# Patient Record
Sex: MALE | Race: WHITE | HISPANIC OR LATINO | Employment: FULL TIME | URBAN - METROPOLITAN AREA
[De-identification: names, ages, dates, MRNs, and addresses within clinical notes are randomized per-mention and may not be internally consistent; named-entity substitution may affect disease eponyms.]

---

## 2018-02-26 ENCOUNTER — TRANSCRIBE ORDERS (OUTPATIENT)
Dept: PHYSICAL THERAPY | Facility: CLINIC | Age: 23
End: 2018-02-26

## 2018-02-26 DIAGNOSIS — S46.911A SHOULDER STRAIN, RIGHT, INITIAL ENCOUNTER: ICD-10-CM

## 2018-02-26 DIAGNOSIS — S46.912A STRAIN OF LEFT SHOULDER, INITIAL ENCOUNTER: Primary | ICD-10-CM

## 2018-02-28 ENCOUNTER — TREATMENT (OUTPATIENT)
Dept: PHYSICAL THERAPY | Facility: CLINIC | Age: 23
End: 2018-02-28

## 2018-02-28 DIAGNOSIS — M25.511 ACUTE PAIN OF BOTH SHOULDERS: Primary | ICD-10-CM

## 2018-02-28 DIAGNOSIS — S46.911A SHOULDER STRAIN, RIGHT, INITIAL ENCOUNTER: ICD-10-CM

## 2018-02-28 DIAGNOSIS — S46.912A STRAIN OF LEFT SHOULDER, INITIAL ENCOUNTER: ICD-10-CM

## 2018-02-28 DIAGNOSIS — M25.512 ACUTE PAIN OF BOTH SHOULDERS: Primary | ICD-10-CM

## 2018-02-28 PROCEDURE — 97014 ELECTRIC STIMULATION THERAPY: CPT | Performed by: PHYSICAL THERAPIST

## 2018-02-28 PROCEDURE — 97530 THERAPEUTIC ACTIVITIES: CPT | Performed by: PHYSICAL THERAPIST

## 2018-02-28 PROCEDURE — 97001 PR PHYS THERAPY EVALUATION: CPT | Performed by: PHYSICAL THERAPIST

## 2018-02-28 PROCEDURE — 97110 THERAPEUTIC EXERCISES: CPT | Performed by: PHYSICAL THERAPIST

## 2018-03-02 ENCOUNTER — TREATMENT (OUTPATIENT)
Dept: PHYSICAL THERAPY | Facility: CLINIC | Age: 23
End: 2018-03-02

## 2018-03-02 DIAGNOSIS — S46.911D STRAIN OF RIGHT SHOULDER, SUBSEQUENT ENCOUNTER: Primary | ICD-10-CM

## 2018-03-02 PROCEDURE — 97110 THERAPEUTIC EXERCISES: CPT | Performed by: PHYSICAL THERAPIST

## 2018-03-02 PROCEDURE — 97014 ELECTRIC STIMULATION THERAPY: CPT | Performed by: PHYSICAL THERAPIST

## 2018-03-02 PROCEDURE — 97530 THERAPEUTIC ACTIVITIES: CPT | Performed by: PHYSICAL THERAPIST

## 2018-03-02 NOTE — PROGRESS NOTES
"Physical Therapy Daily Progress Note    Time In 1317  Time Out 1404    Conrado An reports: \"My shoulder is feeling a little better. There is still one spot that is a little sore.\"    Subjective     Objective   See Exercise, Manual, and Modality Logs for complete treatment.   *Initiated ball wall circles in flexion and abduction  *Initiated wall push-ups  *Increased reps of scapular strengthening activities  *Initiated UBE     Assessment & Plan     Assessment  Assessment details: Patient reports mild improvement in (R) shoulder s/s since initial evaluation.  He is able to progress most strengthening activities this date as well as initiate UBE, wall push-ups, and ball wall circles for stabilization but does c/o some soreness and discomfort with abduction and external rotation activities.  He responds positively to modality application.        Progress strengthening /stabilization /functional activity         Manual Therapy:         mins  99454;  Therapeutic Exercise:    18     mins  85093;     Neuromuscular Adolph:        mins  82512;    Therapeutic Activity:     14     mins  16817;     Gait Training:           mins  25638;     Ultrasound:          mins  09510;    Electrical Stimulation:    15     mins  05864 ( );  Dry Needling          mins self-pay    Timed Treatment:   32   mins   Total Treatment:     47   mins    Negrita Smith PT, DPT  Physical Therapist  [place] License # [id number]    "

## 2018-03-05 ENCOUNTER — TREATMENT (OUTPATIENT)
Dept: PHYSICAL THERAPY | Facility: CLINIC | Age: 23
End: 2018-03-05

## 2018-03-05 DIAGNOSIS — S46.911D STRAIN OF RIGHT SHOULDER, SUBSEQUENT ENCOUNTER: Primary | ICD-10-CM

## 2018-03-05 DIAGNOSIS — M25.512 ACUTE PAIN OF BOTH SHOULDERS: ICD-10-CM

## 2018-03-05 DIAGNOSIS — M25.511 ACUTE PAIN OF BOTH SHOULDERS: ICD-10-CM

## 2018-03-05 PROCEDURE — 97110 THERAPEUTIC EXERCISES: CPT | Performed by: PHYSICAL THERAPIST

## 2018-03-05 PROCEDURE — 97014 ELECTRIC STIMULATION THERAPY: CPT | Performed by: PHYSICAL THERAPIST

## 2018-03-05 PROCEDURE — 97530 THERAPEUTIC ACTIVITIES: CPT | Performed by: PHYSICAL THERAPIST

## 2018-03-05 NOTE — PROGRESS NOTES
"Physical Therapy Progress Note    Time In 0902  Time Out 0959      3/5/2018  Justin Johnson MD    Re: Conrado An  ________________________________________________________________    Mr. Conrado An, has attended 3/3 PT sessions.  Treatment has consisted of: patient education, therapeutic exercise, therapeutic activity, and modalities.    S: Mr. Conrado An states: \"My shoulder is doing a lot better.  I just occasionally have some sharp pains in my shoulder and it feels weak at times. There are a few directions of the [isometrics] that aggravate my shoulder a little bit and make my bicep muscle feel weak. [Patient indicates external rotation and abduction and to much lesser extent internal rotation.\"    Subjective Evaluation    Pain  Current pain ratin  At best pain ratin  At worst pain ratin  Location: (R) anterior shoulder, LHB tendon  Quality: sharp           Objective       Tenderness     Left Shoulder   No tenderness     Right Shoulder  Tenderness in the biceps tendon (proximal), bicipital groove and supraspinatus tendon.     Active Range of Motion   Left Shoulder   Flexion: 162 degrees   Extension: 61 degrees   Abduction: 171 degrees   External rotation 90°: 96 degrees   Internal rotation 90°: 74 degrees     Right Shoulder   Flexion: 171 degrees   Extension: 50 degrees   Abduction: 174 degrees   External rotation 90°: 92 degrees  Internal rotation 90°: 61 (\"feels weak around 45 deg\") degrees     Strength/Myotome Testing     Left Shoulder     Planes of Motion   Flexion: 5   Extension: 5   Abduction: 5   External rotation at 0°: 5   Internal rotation at 0°: 5     Right Shoulder     Planes of Motion   Flexion: 4+ (sharp pain)   Extension: 5   Right shoulder abduction strength: 5-/5; \"feels a little weak\"   External rotation at 0°: 4+   Internal rotation at 0°: 5     Left Elbow   Flexion: 5  Extension: 5    Right Elbow   Flexion: 5  Extension: 5    Tests     Right Shoulder   Positive active " "compression ([place]) and empty can.   Negative Speed's.     Additional Tests Details  Mild weakness and pain with empty can  Sharp pain with Panchal's (thumb up position worse than thumb down)     See Exercise, Manual, and Modality Logs for complete treatment.       Assessment & Plan     Assessment  Assessment details: Patient has been compliant and cooperative with PT efforts.  He verbalizes much improved (R) shoulder s/s, though he is not yet 100%.  He reports (R) anterior shoulder pain (to lesser degree) in push-up position and experiences irritation of (R) shoulder s/s with isometric abduction and external rotation, citing also a feeling of \"weakness in my bicep muscle.\"  Special testing indicates weakness with empty can and sharp pain and weakness with Obriens test, moreso in thumb up position than thumb down.  Due to progress seen with PT interventions thus far but mild persistent deficits, patient may benefit from one additional week to progress strengthening for return to optimal, painfree function.  Please advise after your exam.        Awaiting MD orders - patient to see MD immediately following session.         Manual Therapy:         mins  34280;  Therapeutic Exercise:    24     mins  99082;     Neuromuscular Adolph:        mins  02550;    Therapeutic Activity:     18     mins  33626;     Gait Training:           mins  23996;     Ultrasound:          mins  51759;    Electrical Stimulation:    15     mins  43514 ( );  Dry Needling          mins self-pay    Timed Treatment:   42   mins   Total Treatment:     57   mins    Negrita Smith PT, DPT  Physical Therapist  [place] License # [id number]  "

## 2018-03-08 ENCOUNTER — OFFICE VISIT (OUTPATIENT)
Dept: PHYSICAL THERAPY | Facility: CLINIC | Age: 23
End: 2018-03-08

## 2018-03-08 DIAGNOSIS — M25.512 ACUTE PAIN OF BOTH SHOULDERS: ICD-10-CM

## 2018-03-08 DIAGNOSIS — M25.511 ACUTE PAIN OF BOTH SHOULDERS: ICD-10-CM

## 2018-03-08 DIAGNOSIS — S46.911D STRAIN OF RIGHT SHOULDER, SUBSEQUENT ENCOUNTER: Primary | ICD-10-CM

## 2018-03-08 PROCEDURE — 97110 THERAPEUTIC EXERCISES: CPT | Performed by: PHYSICAL THERAPIST

## 2018-03-08 PROCEDURE — 97530 THERAPEUTIC ACTIVITIES: CPT | Performed by: PHYSICAL THERAPIST

## 2018-03-08 NOTE — PROGRESS NOTES
Physical Therapy Daily Progress Note    Time In 0900  Time Out 1015    Conrado An reports: shoulder a little bothersome with wall push ups today states patient.  Expressed feeling pain in shoulder with activity.    Subjective     Objective       Postural Observations  Seated posture: fair  Standing posture: fair  Correction of posture: makes symptoms better    Additional Postural Observation Details  Rounded shoulders, increased kyphosis  Corrects with cues with reported relief and improved ease of movement with exercises.    Palpation     Right Tenderness of the middle deltoid and posterior deltoid.     Tenderness     Right Shoulder  Tenderness in the bicipital groove and supraspinatus tendon.      See Exercise, Manual, and Modality Logs for complete treatment.   Pain free exercise performance  Exercise rationale and progression, healing process, purpose of therapy  Anatomy and structure of affected musculature   Postural awareness  Verbal/tactile cues in regards to shoulder positioning with exercise performance/actvity.   Cues for proper performance with body positioning with wall push ups.  Added: shoulder ext jose alfredo, sh IR and ER with t band  Issued blue band for home use and progression.  Already has green band      Assessment/Plan  Complaint and cooperative with rehab efforts.  Shoulder is improving but still feels weak with certain movements/positions right arm.  Benefits from verbal and tactile cues to ensure proper posture and scapular positioning with exercise/activity.  Progressing well, sees benefit with PT and notes relief with modality and ice application.    Progress strengthening /stabilization /functional activity as symptoms allow.           Manual Therapy:         mins  16588;  Therapeutic Exercise:     25    mins  37026;     Neuromuscular Adolph:       mins  69191;    Therapeutic Activity:     25     mins  29599;     Gait Training:           mins  78172;     Ultrasound:          mins  69554;     Electrical Stimulation:    20     mins  72452 ( );      Timed Treatment:  70   mins   Total Treatment:    75   mins    Byron Pereira PTA    Physical Therapist Assistant [place] [id number]

## 2018-03-09 ENCOUNTER — OFFICE VISIT (OUTPATIENT)
Dept: PHYSICAL THERAPY | Facility: CLINIC | Age: 23
End: 2018-03-09

## 2018-03-09 DIAGNOSIS — M25.511 ACUTE PAIN OF BOTH SHOULDERS: ICD-10-CM

## 2018-03-09 DIAGNOSIS — S46.911D STRAIN OF RIGHT SHOULDER, SUBSEQUENT ENCOUNTER: Primary | ICD-10-CM

## 2018-03-09 DIAGNOSIS — M25.512 ACUTE PAIN OF BOTH SHOULDERS: ICD-10-CM

## 2018-03-09 PROCEDURE — 97530 THERAPEUTIC ACTIVITIES: CPT | Performed by: PHYSICAL THERAPIST

## 2018-03-09 PROCEDURE — 97014 ELECTRIC STIMULATION THERAPY: CPT | Performed by: PHYSICAL THERAPIST

## 2018-03-09 PROCEDURE — 97110 THERAPEUTIC EXERCISES: CPT | Performed by: PHYSICAL THERAPIST

## 2018-03-09 NOTE — PROGRESS NOTES
Physical Therapy Daily Progress Note    Time In 0930 Time Out 1055    Conrado An reports: shoulder feels weak with certain activities(abduction, er) though I worked it pretty hard today with the bands states patient.    Subjective     Objective       Active Range of Motion     Right Shoulder   Flexion: 170 (deg seated) degrees   Abduction: 180 (deg seated) degrees   External rotation 90°: 92 degrees  External rotation BTH: T4 (deg seated)   Internal rotation 90°: 65 (deg seated) degrees   Internal rotation BTB: T10      See Exercise, Manual, and Modality Logs for complete treatment.     [place] box activities chest, shoulder, overhead level x 2 reps each in 1 min increments each totaling 6 mins.    Assessment/Plan  Subjective complaints right shoulder discomfort remain present but patient demonstrates improved capability to perform exercise/activity without increased symptoms.  Benefits from postural awareness cues(verbal and tactile) to decrease kyphosis and improve scapular positioning.  Improved positioning of shoulder blades lessens impingement complaints and subsequent improved ROM right shoulder is demonstrated.  Some restrictions still present in IR plane.    Progress strengthening /stabilization /functional activity.  Begin box lifts and unilateral carry activities.           Manual Therapy:        mins  85963;  Therapeutic Exercise:    35     mins  97626;     Neuromuscular Adolph:       mins  70850;    Therapeutic Activity:     15     mins  27855;     Gait Training:           mins  94197;     Ultrasound:         mins  63746;    Electrical Stimulation:   20     mins  31189 ( );      Timed Treatment:50   mins   Total Treatment:   85    mins    Byron Pereira, PTA    Physical Therapist Assistant [place] [id number]

## 2018-03-12 ENCOUNTER — OFFICE VISIT (OUTPATIENT)
Dept: PHYSICAL THERAPY | Facility: CLINIC | Age: 23
End: 2018-03-12

## 2018-03-12 DIAGNOSIS — S46.911D STRAIN OF RIGHT SHOULDER, SUBSEQUENT ENCOUNTER: Primary | ICD-10-CM

## 2018-03-12 DIAGNOSIS — M25.511 ACUTE PAIN OF BOTH SHOULDERS: ICD-10-CM

## 2018-03-12 DIAGNOSIS — M25.512 ACUTE PAIN OF BOTH SHOULDERS: ICD-10-CM

## 2018-03-12 PROCEDURE — 97014 ELECTRIC STIMULATION THERAPY: CPT | Performed by: PHYSICAL THERAPIST

## 2018-03-12 PROCEDURE — 97110 THERAPEUTIC EXERCISES: CPT | Performed by: PHYSICAL THERAPIST

## 2018-03-12 PROCEDURE — 97530 THERAPEUTIC ACTIVITIES: CPT | Performed by: PHYSICAL THERAPIST

## 2018-03-12 NOTE — PROGRESS NOTES
Physical Therapy Daily Progress Note    Time In 0930  Time Out 1110    Conrado Nataliya reports: shoulder still bothers me with certain motions/activities. [place] box was much harder overhead than at chest level for me states patient    Subjective     Objective   See Exercise, Manual, and Modality Logs for complete treatment.   Box lift 5-20 lbs 14 inch height to waist; 5-15 lbs to ch/sh level and 5 -10 lbs overhead       Assessment/Plan  Subjective complaints of right shoulder still present, increased with prolonged activity.  Demonstrates improved performance of HEP activities without previous complaints of discomfort while demonstrating prolonged endurance/duration of activity.      Other  See one more time prior to MD follow up.           Manual Therapy:        mins  25571;  Therapeutic Exercise:  35       mins  40610;     Neuromuscular Adolph:       mins  38045;    Therapeutic Activity:    20      mins  43774;     Gait Training:          mins  96704;     Ultrasound:          mins  99002;    Electrical Stimulation:  20     mins  39374 ( );      Timed Treatment:  55   mins   Total Treatment:    100   mins    Byron Pereira PTA    Physical Therapist Assistant [place] [id number]

## 2018-03-13 ENCOUNTER — OFFICE VISIT (OUTPATIENT)
Dept: PHYSICAL THERAPY | Facility: CLINIC | Age: 23
End: 2018-03-13

## 2018-03-13 DIAGNOSIS — M25.511 ACUTE PAIN OF BOTH SHOULDERS: ICD-10-CM

## 2018-03-13 DIAGNOSIS — S46.911D STRAIN OF RIGHT SHOULDER, SUBSEQUENT ENCOUNTER: Primary | ICD-10-CM

## 2018-03-13 DIAGNOSIS — M25.512 ACUTE PAIN OF BOTH SHOULDERS: ICD-10-CM

## 2018-03-13 PROCEDURE — 97530 THERAPEUTIC ACTIVITIES: CPT | Performed by: PHYSICAL THERAPIST

## 2018-03-13 PROCEDURE — 97110 THERAPEUTIC EXERCISES: CPT | Performed by: PHYSICAL THERAPIST

## 2018-03-13 NOTE — PROGRESS NOTES
Physical Therapy Progress Note    Time In 0900  Time Out 1105      3/13/2018  Justin Johnson MD    Re: oCnrado An  ________________________________________________________________    Mr. Conrado An, has attended 3/3 additional PT sessions to his shoulders(primarily right shoulder), 6 times overall.  Treatment has consisted of: therapeutic exercise, simulated job activities, ice/electrical stimulation and patient education.     S: Mr. Conrado An states: that his shoulders feel better since last MD follow up/continuing PT.  Reports that he can lift more and has more strength in his shoulders but still notes discomfort with certain positions/movements.  Feels about 80% improved overall.    Subjective Evaluation    Pain  Current pain rating: 3  At best pain ratin  At worst pain ratin  Quality: sharp and knife-like  Relieving factors: change in position and rest           Objective       Palpation     Right Tenderness of the middle deltoid and supraspinatus.     Tenderness     Left Shoulder   Tenderness in the bicipital groove.     Right Shoulder  Tenderness in the supraspinatus tendon.     Active Range of Motion   Left Shoulder   Flexion: 175 (deg) degrees   Abduction: 178 (deg) degrees   External rotation BTH: T3   Internal rotation BTB: T10     Right Shoulder   Flexion: 170 (deg) degrees   Abduction: 172 (deg) degrees   External rotation BTH: T3   Internal rotation BTB: T10     Strength/Myotome Testing     Left Shoulder     Planes of Motion   Flexion: 5   Abduction: 5   External rotation at 0°: 5   Internal rotation at 0°: 5     Isolated Muscles   Rhomboids: 5     Right Shoulder     Planes of Motion   Flexion: 4 (pain with resistive testing)   Abduction: 4+   External rotation at 0°: 4   Internal rotation at 0°: 4+     Isolated Muscles   Rhomboids: 4+     Tests     Left Shoulder   Negative empty can, Hawkin's, Neer's and Speed's.     Right Shoulder   Positive empty can, Hawkin's and Neer's.   Negative  Speed's.      See Exercise, Manual, and Modality Logs for complete treatment.   Added upper trap stretch right  Performed unilateral bucket carries R and L UE 5-40 lbs  Performed lift trials with simulated job activities:  Waist to shoulder 2.5 lbs to 25 lbs and shoulder to overhead 2.5 lbs - 7.5 lbs  [place] box activities overhead x 2 min.        Assessment/Plan  Compliant and cooperative with rehab efforts.  Subjectively reports continued improvement of his shoulders in regards to strength and function, though still notes some pain/discomfort with certain movements/positions.  Objectively, he presents with improved pain free shoulder AROM and mm strength, though some decreased strength is noted R versus L UE especially in abd/ER and IR planes.  Special tests reveal some positive impingement signs R versus L UE and palpation reveals tenderness along his right suprasinatus.  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   He may benefit from a short course of continued PT to help further/fully resolve his symptoms to perform his unrestricted job activities.  Please advise after your exam!    Other   To MD post Rx with letter.  Will await further orders regarding continued PT intervention           Manual Therapy:        mins  60717;  Therapeutic Exercise:   30     mins  46994;     Neuromuscular Adolph:       mins  91279;    Therapeutic Activity:     28    mins  24951;     Gait Training:           mins  06876;     Ultrasound:         mins  37203;    Electrical Stimulation:   15     mins  17649 ( );      Timed Treatment:   73   mins   Total Treatment:     125  mins    Byron Pereira PTA  Physical Therapist Assistant # [id number]

## 2019-04-07 ENCOUNTER — HOSPITAL ENCOUNTER (OUTPATIENT)
Dept: URGENT CARE | Facility: CLINIC | Age: 24
Discharge: HOME | End: 2019-04-07

## 2019-04-09 ENCOUNTER — HOSPITAL ENCOUNTER (OUTPATIENT)
Dept: URGENT CARE | Facility: CLINIC | Age: 24
Discharge: HOME | End: 2019-04-09

## 2019-04-26 ENCOUNTER — HOSPITAL ENCOUNTER (OUTPATIENT)
Dept: URGENT CARE | Facility: CLINIC | Age: 24
Discharge: HOME | End: 2019-04-26
Attending: PHYSICIAN ASSISTANT

## 2019-07-15 ENCOUNTER — HOSPITAL ENCOUNTER (OUTPATIENT)
Dept: URGENT CARE | Facility: CLINIC | Age: 24
Discharge: HOME | End: 2019-07-15
Attending: EMERGENCY MEDICINE

## 2019-10-16 ENCOUNTER — HOSPITAL ENCOUNTER (OUTPATIENT)
Dept: FAMILY MEDICINE CLINIC | Facility: CLINIC | Age: 24
Discharge: HOME | End: 2019-10-16
Attending: NURSE PRACTITIONER

## 2019-10-16 ENCOUNTER — OFFICE VISIT CONVERTED (OUTPATIENT)
Dept: FAMILY MEDICINE CLINIC | Facility: CLINIC | Age: 24
End: 2019-10-16
Attending: NURSE PRACTITIONER

## 2019-10-16 LAB
ALBUMIN SERPL-MCNC: 4.9 G/DL (ref 3.5–5)
ALBUMIN/GLOB SERPL: 1.8 {RATIO} (ref 1.4–2.6)
ALP SERPL-CCNC: 67 U/L (ref 53–128)
ALT SERPL-CCNC: 64 U/L (ref 10–40)
ANION GAP SERPL CALC-SCNC: 22 MMOL/L (ref 8–19)
AST SERPL-CCNC: 36 U/L (ref 15–50)
BASOPHILS # BLD AUTO: 0.05 10*3/UL (ref 0–0.2)
BASOPHILS NFR BLD AUTO: 0.8 % (ref 0–3)
BILIRUB SERPL-MCNC: 1.25 MG/DL (ref 0.2–1.3)
BUN SERPL-MCNC: 12 MG/DL (ref 5–25)
BUN/CREAT SERPL: 11 {RATIO} (ref 6–20)
CALCIUM SERPL-MCNC: 10 MG/DL (ref 8.7–10.4)
CHLORIDE SERPL-SCNC: 103 MMOL/L (ref 99–111)
CHOLEST SERPL-MCNC: 156 MG/DL (ref 107–200)
CHOLEST/HDLC SERPL: 6.8 {RATIO} (ref 3–6)
CONV ABS IMM GRAN: 0.04 10*3/UL (ref 0–0.2)
CONV CO2: 21 MMOL/L (ref 22–32)
CONV IMMATURE GRAN: 0.7 % (ref 0–1.8)
CONV TOTAL PROTEIN: 7.6 G/DL (ref 6.3–8.2)
CREAT UR-MCNC: 1.11 MG/DL (ref 0.7–1.2)
DEPRECATED RDW RBC AUTO: 42 FL (ref 35.1–43.9)
EOSINOPHIL # BLD AUTO: 0.13 10*3/UL (ref 0–0.7)
EOSINOPHIL # BLD AUTO: 2.2 % (ref 0–7)
ERYTHROCYTE [DISTWIDTH] IN BLOOD BY AUTOMATED COUNT: 12.9 % (ref 11.6–14.4)
EST. AVERAGE GLUCOSE BLD GHB EST-MCNC: 80 MG/DL
GFR SERPLBLD BASED ON 1.73 SQ M-ARVRAT: >60 ML/MIN/{1.73_M2}
GLOBULIN UR ELPH-MCNC: 2.7 G/DL (ref 2–3.5)
GLUCOSE SERPL-MCNC: 91 MG/DL (ref 70–99)
HBA1C MFR BLD: 4.4 % (ref 3.5–5.7)
HCT VFR BLD AUTO: 40.4 % (ref 42–52)
HDLC SERPL-MCNC: 23 MG/DL (ref 40–60)
HGB BLD-MCNC: 13.9 G/DL (ref 14–18)
LDLC SERPL CALC-MCNC: 92 MG/DL (ref 70–100)
LYMPHOCYTES # BLD AUTO: 1.65 10*3/UL (ref 1–5)
LYMPHOCYTES NFR BLD AUTO: 27.5 % (ref 20–45)
MCH RBC QN AUTO: 30.8 PG (ref 27–31)
MCHC RBC AUTO-ENTMCNC: 34.4 G/DL (ref 33–37)
MCV RBC AUTO: 89.6 FL (ref 80–96)
MONOCYTES # BLD AUTO: 0.58 10*3/UL (ref 0.2–1.2)
MONOCYTES NFR BLD AUTO: 9.7 % (ref 3–10)
NEUTROPHILS # BLD AUTO: 3.54 10*3/UL (ref 2–8)
NEUTROPHILS NFR BLD AUTO: 59.1 % (ref 30–85)
NRBC CBCN: 0 % (ref 0–0.7)
OSMOLALITY SERPL CALC.SUM OF ELEC: 293 MOSM/KG (ref 273–304)
PLATELET # BLD AUTO: 185 10*3/UL (ref 130–400)
PMV BLD AUTO: 12.2 FL (ref 9.4–12.4)
POTASSIUM SERPL-SCNC: 4.3 MMOL/L (ref 3.5–5.3)
RBC # BLD AUTO: 4.51 10*6/UL (ref 4.7–6.1)
SODIUM SERPL-SCNC: 142 MMOL/L (ref 135–147)
TRIGL SERPL-MCNC: 204 MG/DL (ref 40–150)
TSH SERPL-ACNC: 3.93 M[IU]/L (ref 0.27–4.2)
VLDLC SERPL-MCNC: 41 MG/DL (ref 5–37)
WBC # BLD AUTO: 5.99 10*3/UL (ref 4.8–10.8)

## 2020-01-08 ENCOUNTER — OFFICE VISIT CONVERTED (OUTPATIENT)
Dept: UROLOGY | Facility: CLINIC | Age: 25
End: 2020-01-08
Attending: NURSE PRACTITIONER

## 2020-05-20 ENCOUNTER — OFFICE VISIT CONVERTED (OUTPATIENT)
Dept: FAMILY MEDICINE CLINIC | Facility: CLINIC | Age: 25
End: 2020-05-20
Attending: NURSE PRACTITIONER

## 2020-06-24 ENCOUNTER — HOSPITAL ENCOUNTER (OUTPATIENT)
Dept: URGENT CARE | Facility: CLINIC | Age: 25
Discharge: HOME | End: 2020-06-24

## 2020-06-30 ENCOUNTER — HOSPITAL ENCOUNTER (OUTPATIENT)
Dept: URGENT CARE | Facility: CLINIC | Age: 25
Discharge: HOME | End: 2020-06-30
Attending: EMERGENCY MEDICINE

## 2020-07-01 LAB — H PYLORI IGG SER IA-ACNC: 0.26 INDEX VALUE (ref 0–0.79)

## 2020-07-02 ENCOUNTER — OFFICE VISIT CONVERTED (OUTPATIENT)
Dept: FAMILY MEDICINE CLINIC | Facility: CLINIC | Age: 25
End: 2020-07-02
Attending: NURSE PRACTITIONER

## 2020-07-13 ENCOUNTER — HOSPITAL ENCOUNTER (OUTPATIENT)
Dept: ULTRASOUND IMAGING | Facility: HOSPITAL | Age: 25
Discharge: HOME | End: 2020-07-13
Attending: NURSE PRACTITIONER

## 2020-07-17 ENCOUNTER — PROCEDURE VISIT CONVERTED (OUTPATIENT)
Dept: UROLOGY | Facility: CLINIC | Age: 25
End: 2020-07-17
Attending: UROLOGY

## 2020-07-20 ENCOUNTER — OFFICE VISIT CONVERTED (OUTPATIENT)
Dept: FAMILY MEDICINE CLINIC | Facility: CLINIC | Age: 25
End: 2020-07-20
Attending: NURSE PRACTITIONER

## 2020-07-20 ENCOUNTER — HOSPITAL ENCOUNTER (OUTPATIENT)
Dept: FAMILY MEDICINE CLINIC | Facility: CLINIC | Age: 25
Discharge: HOME | End: 2020-07-20
Attending: NURSE PRACTITIONER

## 2020-07-21 LAB — URATE SERPL-MCNC: 7.3 MG/DL (ref 3.5–8.5)

## 2020-09-11 ENCOUNTER — HOSPITAL ENCOUNTER (OUTPATIENT)
Dept: URGENT CARE | Facility: CLINIC | Age: 25
Discharge: HOME | End: 2020-09-11
Attending: EMERGENCY MEDICINE

## 2020-09-13 LAB — SARS-COV-2 RNA SPEC QL NAA+PROBE: NOT DETECTED

## 2020-10-29 ENCOUNTER — HOSPITAL ENCOUNTER (OUTPATIENT)
Dept: URGENT CARE | Facility: CLINIC | Age: 25
Discharge: HOME | End: 2020-10-29

## 2020-11-01 LAB — SARS-COV-2 RNA SPEC QL NAA+PROBE: NOT DETECTED

## 2021-02-08 ENCOUNTER — OFFICE VISIT CONVERTED (OUTPATIENT)
Dept: FAMILY MEDICINE CLINIC | Facility: CLINIC | Age: 26
End: 2021-02-08
Attending: NURSE PRACTITIONER

## 2021-03-08 ENCOUNTER — TELEMEDICINE CONVERTED (OUTPATIENT)
Dept: FAMILY MEDICINE CLINIC | Facility: CLINIC | Age: 26
End: 2021-03-08
Attending: NURSE PRACTITIONER

## 2021-05-10 NOTE — PROCEDURES
Procedure Note      Patient Name: Conrado An   Patient ID: [id number]   Sex: Male   YOB: 1995    Primary Care Provider: Yessenia BERG   Referring Provider: Yessenia BERG    Visit Date: July 17, 2020    Provider: Mike Hinojosa MD   Location: [facility]   Location Address: [address] [place] [address], [address]  [id number]   Location Phone: [phone]          VASECTOMY PROCEDURE    PATIENT was prepped and drapped in the usual manner. 1% Xylocaine/Marcaine 50:50 ratio (2 x 10 cc syringes) was injected in the area of the incision. Incision was made midline of scrotum using Chinese technique and then carried through the skin and subcutaneous tissue. Right vas was identified and grasped with the Chinese grasper. A segment of the vas was then dissected free. A segement of the vas was then removed. The lumens of the vas were then cauterized. Any bleeders were cauterized.   The left vas was then identified. The left vas ws then grasped with the Chinese grasper. The left vas was dissected free of surrounding tissues. A segment of the left vas was then removed. The ends of the vas were then cauterized. Any bleeders were cauterized.   Pressure dressing was applied. Patient tolerated the procedure well without any immediate complications.           Assessment  · Sterilization     V25.2/Z30.2    Problems Reconciled  Plan  · Orders  o Vasectomies (14576) - V25.2/Z30.2 - 07/17/2020  · Instructions  o Written consent was obtained and scanned into the patient's EMR prior to performance of the procedure today.  o Use scrotal support for the next 2-3 days, typically a jock strap.  o Use ice packs for 10 minutes every 2-3 hours as needed for scrotal discomfort.  o Take pain medication only as directed and only for moderate to severe pain.  o If you develop any fever (Temp 100 F), chills, nausea, vomiting, increased pain, swelling, drainage, or bleeding call my office  immediately.  o Patient understands that he needs to use an alternate form of birth control until he has 2 semen specimens that show no sperm present. He can expect to have some bruising and swelling for the next week. He may return to his normal activities after 48 hours.   o In about 6-8 weeks you will need to obtain a semen specimen on two separate occassions for Dr. Hinojosa to microscopically exam to ensure that no sperm are present.            Electronically Signed by: Mike Hinojosa MD -Author on July 17, 2020 04:15:13 PM

## 2021-05-13 NOTE — PROGRESS NOTES
Progress Note      Patient Name: Conrado An   Patient ID: [id number]   Sex: Male   YOB: 1995    Primary Care Provider: Yessenia BERG   Referring Provider: Yessenia BERG    Visit Date: July 2, 2020    Provider: MORENA Mary   Location: [facility]   Location Address: [address] [place] [address]  [id number]   Location Phone: [phone]          Chief Complaint  · Acute Visit for Abdominal Pain and Vomiting      History Of Present Illness  Conrado An is a 24 year old /White,  or  male who presents for evaluation and treatment of:      Acute Visit for Abdominal Pain and Vomiting after eating in 1 hour and worse at bedtime.   Pt also reported having diarrhea after eating.  This is the 3rd episode of abdominal pain and had to be seen by Urgent Care last on 6/30/20.  Abd x-ray and labs were WNL.    Pt reports when taking Carafate he feels like it coats his throat.     Pt was advised to take benefiber for Constipation.    Pt also requested to have FMLA papers filled out for himself so he does not loose his job when having these flares.    Last BM - soft formed stool. denies blood.                 Past Medical History  Disease Name Date Onset Notes   Lumbago/low back pain 01/19/2015 --    Pars defect of lumbar spine --  --          Past Surgical History  Procedure Name Date Notes   *Denies any surgical procedures --  --          Medication List  Name Date Started Instructions   Carafate 1 gram oral tablet 05/20/2020 take 1 tablet (1 gram) by oral route 4 times per day on an empty stomach 1 hour before meals and at bedtime for 30 days   Contrave 8-90 mg oral tablet extended release 05/20/2020 take 1 tab daily for 1 week, increase to bid for 1 week, then 2 tabs qam and 1 tab at bedtime for 1 week, then 2 tabs bid thereafter   multivitamin oral tablet  take 1 tablet by oral route daily   omeprazole 40 mg oral capsule,delayed release(/EC)  "05/20/2020 take 1 capsule (40 mg) by oral route once daily before a meal for 30 days         Allergy List  Allergen Name Date Reaction Notes   NO KNOWN DRUG ALLERGIES --  --  --          Family Medical History  Disease Name Relative/Age Notes   Stroke  --    Cancer, Unspecified  --    Diabetes, unspecified type  --    Heart Attack (MI)  --          Social History  Finding Status Start/Stop Quantity Notes   Alcohol Never --/-- --  --    Tobacco Never --/-- --  --          Review of Systems  · Constitutional  o Denies  o : fatigue, fever, weight gain, weight loss, chills  · Cardiovascular  o Denies  o : chest Pain, palpitations, edema (swelling)  · Respiratory  o Denies  o : frequent cough, shortness of breath  · Gastrointestinal  o Admits  o : abdominal pain, vomiting, diarrhea  o Denies  o : nausea, changes in bowel habits  · Genitourinary  o Denies  o : dysuria, urinary frequency, urinary urgency, polyuria  · Neurologic  o Denies  o : headache, tingling or numbness, dizziness  · Endocrine  o Denies  o : polydipsia, polyphagia      Vitals  Date Time BP Position Site L\R Cuff Size HR RR TEMP (F) WT  HT  BMI kg/m2 BSA m2 O2 Sat HC       01/08/2020 10:24 /77 Sitting    82 - R  98.4 267lbs 0oz 5'  11\" 37.24 2.46     05/20/2020 07:43 /62 Sitting    88 - R 18 97.1 271lbs 8oz 5'  11\" 37.87 2.48 98 %    07/02/2020 02:34 /62 Sitting    77 - R 18 97.6 271lbs 0oz 5'  11\" 37.8 2.48 97 %          Physical Examination  · Constitutional  o Appearance  o : well-nourished, in no acute distress  · Eyes  o Conjunctivae  o : conjunctivae normal  o Sclerae  o : sclerae white  o Pupils and Irises  o : pupils equal and round  o Eyelids/Ocular Adnexae  o : eyelid appearance normal, no exudates present  · Respiratory  o Respiratory Effort  o : breathing unlabored  o Inspection of Chest  o : normal appearance  o Auscultation of Lungs  o : normal breath sounds throughout inspiration and " expiration  · Cardiovascular  o Heart  o :   § Auscultation of Heart  § : regular rate and rhythm, no murmurs, gallops or rubs  · Gastrointestinal  o Abdominal Examination  o : epigastric region periumbilical region tenderness to palpation present, normal bowel sounds, tone normal without rigidity or guarding, no masses present, abdomen scaphoid upon supine  · Skin and Subcutaneous Tissue  o General Inspection  o : no rashes or lesions present, no areas of discoloration  o Body Hair  o : hair normal for age, general body hair distribution normal for age  o Digits and Nails  o : no clubbing, cyanosis, deformities or edema present, normal appearing nails  · Neurologic  o Mental Status Examination  o :   § Orientation  § : grossly oriented to person, place and time  o Gait and Station  o : normal gait, able to stand without difficulty  · Psychiatric  o Judgement and Insight  o : judgment and insight intact  o Mood and Affect  o : mood normal, affect appropriate          Assessment  · Abdominal pain     789.00/R10.9  · Diarrhea     787.91/R19.7  · Vomiting     787.03/R11.10      Plan  · Orders  o DISIDA (HIDA) Scan (75153) - 789.00/R10.9 - 07/02/2020  o Gallbladder U/S (33553) - 789.00/R10.9 - 07/02/2020   hida scan to follow if gb u/s negative  o ACO-39: Current medications updated and reviewed () - - 07/02/2020  o GASTROENTEROLOGY (GASTR) - 789.00/R10.9, 787.03/R11.10, 787.91/R19.7 - 07/02/2020   schedule after hida scan  · Medications  o Medications have been Reconciled  o Transition of Care or Provider Policy  · Instructions  o Instructed to seek medical attention urgently for new or worsening symptoms.  o Handouts were given to patient: gerd  o Patient was educated/instructed on their diagnosis, treatment and medications prior to discharge from the clinic today.  o Call the office with any concerns or questions.  o Electronically Identified Patient Education Materials Provided  Electronically  · Disposition  o Call or Return if symptoms worsen or persist.  o Follow up pending results.            Electronically Signed by: MORENA Mary -Author on July 2, 2020 03:08:58 PM

## 2021-05-13 NOTE — PROGRESS NOTES
Progress Note      Patient Name: Conrado An   Patient ID: [id number]   Sex: Male   YOB: 1995    Primary Care Provider: Yessenia BERG   Referring Provider: Yessenia BERG    Visit Date: May 20, 2020    Provider: MORENA Mary   Location: [facility]   Location Address: [address] [place] [address]  [id number]   Location Phone: [phone]          Chief Complaint  · Acute Visit for Stomach Pain and Possible Ulcers      History Of Present Illness  Conrado An is a 24 year old /White,  or  male who presents for evaluation and treatment of:      Acute Visit for Stomach Pain and Possible Ulcers    Past Colonoscopy done 6 yrs ago per Pt (no copy of reports in chart). [facility], pt reports nothing was found.     Pt reported taking acid reducers PRN for reflux, but not on a daily basis.  Pt says the stomach pain was really bad yesterday, better today. Just comes and goes.    Pt requested a work note for today and tomorrow    pt concerned with weight. pt is not doing diet or exercise modifications.                 Past Medical History  Disease Name Date Onset Notes   Lumbago/low back pain 01/19/2015 --    Pars defect of lumbar spine --  --          Past Surgical History  Procedure Name Date Notes   *Denies any surgical procedures --  --          Medication List  Name Date Started Instructions   multivitamin oral tablet  take 1 tablet by oral route daily         Allergy List  Allergen Name Date Reaction Notes   NO KNOWN DRUG ALLERGIES --  --  --          Family Medical History  Disease Name Relative/Age Notes   Stroke  --    Cancer, Unspecified  --    Diabetes, unspecified type  --    Heart Attack (MI)  --          Social History  Finding Status Start/Stop Quantity Notes   Alcohol Never --/-- --  --    Tobacco Never --/-- --  --          Review of Systems  · Constitutional  o Admits  o : weight gain  o Denies  o : fatigue,  "fever, weight loss, chills  · Cardiovascular  o Denies  o : chest Pain, palpitations, edema (swelling)  · Respiratory  o Denies  o : frequent cough, shortness of breath  · Gastrointestinal  o Admits  o : abdominal pain, reflux/heartburn, diarrhea  o Denies  o : nausea, vomiting, changes in bowel habits  · Genitourinary  o Denies  o : dysuria, urinary frequency, urinary urgency, polyuria  · Neurologic  o Denies  o : headache, tingling or numbness, dizziness  · Musculoskeletal  o Admits  o : joint pain  o Denies  o : myalgias      Vitals  Date Time BP Position Site L\R Cuff Size HR RR TEMP (F) WT  HT  BMI kg/m2 BSA m2 O2 Sat HC       10/16/2019 10:49 /70 Sitting    77 - R 18 97.1 267lbs 8oz 5'  11\" 37.31 2.47 93 %    01/08/2020 10:24 /77 Sitting    82 - R  98.4 267lbs 0oz 5'  11\" 37.24 2.46     05/20/2020 07:43 /62 Sitting    88 - R 18 97.1 271lbs 8oz 5'  11\" 37.87 2.48 98 %          Physical Examination  · Constitutional  o Appearance  o : well-nourished, in no acute distress  · Eyes  o Conjunctivae  o : conjunctivae normal  o Sclerae  o : sclerae white  o Pupils and Irises  o : pupils equal and round  o Eyelids/Ocular Adnexae  o : eyelid appearance normal, no exudates present  · Neck  o Inspection/Palpation  o : normal appearance, no masses or tenderness, trachea midline  o Range of Motion  o : cervical range of motion within normal limits  o Thyroid  o : gland size normal, nontender, no nodules or masses present on palpation  · Respiratory  o Respiratory Effort  o : breathing unlabored  o Inspection of Chest  o : normal appearance  o Auscultation of Lungs  o : normal breath sounds throughout inspiration and expiration  · Cardiovascular  o Heart  o :   § Auscultation of Heart  § : regular rate and rhythm, no murmurs, gallops or rubs  o Peripheral Vascular System  o :   § Carotid Arteries  § : normal pulses bilaterally, no bruits present  § Extremities  § : no clubbing or " edema  · Gastrointestinal  o Abdominal Examination  o : abdomen nontender to palpation, tone normal without rigidity or guarding, no masses present, bowel sounds present  · Skin and Subcutaneous Tissue  o General Inspection  o : no rashes or lesions present, no areas of discoloration  o Body Hair  o : hair normal for age, general body hair distribution normal for age  o Digits and Nails  o : no clubbing, cyanosis, deformities or edema present, normal appearing nails  · Neurologic  o Mental Status Examination  o :   § Orientation  § : grossly oriented to person, place and time  o Gait and Station  o : normal gait, able to stand without difficulty  · Psychiatric  o Judgement and Insight  o : judgment and insight intact  o Mood and Affect  o : mood normal, affect appropriate          Assessment  · GERD (gastroesophageal reflux disease)     530.81/K21.9  · Class 2 severe obesity due to excess calories with serious comorbidity and body mass index (BMI) of 38.0 to 38.9 in adult       Morbid (severe) obesity due to excess calories     278.01/E66.01  Body mass index (BMI) 38.0-38.9, adult     278.01/Z68.38  · Stomach pain     536.8/R10.9  · BMI 37.0-37.9, adult     V85.37/Z68.37      Plan  · Orders  o ACO-39: Current medications updated and reviewed () - - 05/20/2020  · Medications  o omeprazole 40 mg oral capsule,delayed release(DR/EC)   SIG: take 1 capsule (40 mg) by oral route once daily before a meal for 30 days   DISP: (30) capsules with 2 refills  Prescribed on 05/20/2020     o Carafate 1 gram oral tablet   SIG: take 1 tablet (1 gram) by oral route 4 times per day on an empty stomach 1 hour before meals and at bedtime for 30 days   DISP: (120) tablets with 0 refills  Prescribed on 05/20/2020     o Contrave 8-90 mg oral tablet extended release   SIG: take 1 tab daily for 1 week, increase to bid for 1 week, then 2 tabs qam and 1 tab at bedtime for 1 week, then 2 tabs bid thereafter   DISP: (120) tablets with 0  refills  Prescribed on 05/20/2020     o Medications have been Reconciled  o Transition of Care or Provider Policy  · Instructions  o Maintain a healthy weight. Avoid tight fitting clothes. Avoid fried, fatty foods, tomato sauce, chocolate, mint, garlic, onion, alcohol. caffeine. Eat smaller meals, dont lie down after a meal, dont smoke. Elevate the head of your bed 6-9 inches.  o Patient instructed/educated on their diet and exercise program.  o Patient was educated/instructed on their diagnosis, treatment and medications prior to discharge from the clinic today.  o Call the office with any concerns or questions.  · Disposition  o Call or Return if symptoms worsen or persist.  o FOLLOW UP PRN            Electronically Signed by: MORENA Mary -Author on May 22, 2020 01:50:32 PM

## 2021-05-13 NOTE — PROGRESS NOTES
Progress Note      Patient Name: Conrado An   Patient ID: [id number]   Sex: Male   YOB: 1995    Primary Care Provider: Yessenia BERG   Referring Provider: Yessenia BERG    Visit Date: July 20, 2020    Provider: MORENA Mary   Location: [facility]   Location Address: [address] [place] [address]  [id number]   Location Phone: [phone]          Chief Complaint  · Follow up from Urgent Care for Gout      History Of Present Illness  Conrado An is a 24 year old /White,  or  male who presents for evaluation and treatment of:      Follow up from [facility] on 7/15/20 for gout  Instructions to take NSAIDs to help with pain/inflammation and given steroid injection.    Pt reported pain in Rt Foot is better. improved after steroid injection.  Pt reported this being his 2nd gout flare up  in Rt foot.  Pt last gout flare was last June.    pt denies any alcohol intake. pt admits to diet high in beans.       Past Medical History  Disease Name Date Onset Notes   GERD (gastroesophageal reflux disease) --  --    Lumbago/low back pain 01/19/2015 --    Pars defect of lumbar spine --  --          Past Surgical History  Procedure Name Date Notes   *Denies any surgical procedures --  --          Medication List  Name Date Started Instructions   multivitamin oral tablet  take 1 tablet by oral route daily   omeprazole 40 mg oral capsule,delayed release(DR/EC) 07/20/2020 take 1 capsule (40 mg) by oral route once daily before a meal for 30 days         Allergy List  Allergen Name Date Reaction Notes   NO KNOWN DRUG ALLERGIES --  --  --          Family Medical History  Disease Name Relative/Age Notes   Stroke  --    Cancer, Unspecified  --    Diabetes, unspecified type  --    Heart Attack (MI)  --          Social History  Finding Status Start/Stop Quantity Notes   Alcohol Never --/-- --  --    Tobacco Never --/-- --  --          Review of  "Systems  · Constitutional  o Admits  o : weight gain  o Denies  o : fatigue, fever, weight loss, chills  · Cardiovascular  o Denies  o : chest Pain, palpitations, edema (swelling)  · Respiratory  o Denies  o : frequent cough, shortness of breath  · Gastrointestinal  o Denies  o : nausea, vomiting, changes in bowel habits  · Musculoskeletal  o Admits  o : foot pain  o Denies  o : joint pain, myalgias      Vitals  Date Time BP Position Site L\R Cuff Size HR RR TEMP (F) WT  HT  BMI kg/m2 BSA m2 O2 Sat        05/20/2020 07:43 /62 Sitting    88 - R 18 97.1 271lbs 8oz 5'  11\" 37.87 2.48 98 %    07/02/2020 02:34 /62 Sitting    77 - R 18 97.6 271lbs 0oz 5'  11\" 37.8 2.48 97 %    07/20/2020 01:31 /68 Sitting    95 - R 18 96.3 277lbs 0oz 5'  11\" 38.63 2.51 97 %          Physical Examination  · Constitutional  o Appearance  o : well-nourished, in no acute distress  · Eyes  o Conjunctivae  o : conjunctivae normal  o Sclerae  o : sclerae white  o Pupils and Irises  o : pupils equal and round  o Eyelids/Ocular Adnexae  o : eyelid appearance normal, no exudates present  · Respiratory  o Respiratory Effort  o : breathing unlabored  o Inspection of Chest  o : normal appearance  o Auscultation of Lungs  o : normal breath sounds throughout inspiration and expiration  · Cardiovascular  o Heart  o :   § Auscultation of Heart  § : regular rate and rhythm, no murmurs, gallops or rubs  · Skin and Subcutaneous Tissue  o General Inspection  o : no rashes or lesions present, no areas of discoloration  o Body Hair  o : hair normal for age, general body hair distribution normal for age  o Digits and Nails  o : no clubbing, cyanosis, deformities or edema present, normal appearing nails  · Neurologic  o Mental Status Examination  o :   § Orientation  § : grossly oriented to person, place and time  o Gait and Station  o : normal gait, able to stand without difficulty  · Psychiatric  o Judgement and Insight  o : judgment and " insight intact  o Mood and Affect  o : mood normal, affect appropriate          Assessment  · Gout     274.9/M10.9  discussed diet modifications. discussed prophylactic therapy with allopurinol. pt declines at present.       Plan  · Orders  o ACO-39: Current medications updated and reviewed () - - 07/20/2020  o Uric Acid Serum [facility] (02294) - 274.9/M10.9 - 07/20/2020  · Medications  o Wellbutrin  mg oral tablet extended release 24 hr   SIG: take 1 tablet (150 mg) by oral route once daily for 30 days   DISP: (30) tablets with 1 refills  Prescribed on 07/20/2020     o omeprazole 40 mg oral capsule,delayed release(DR/EC)   SIG: take 1 capsule (40 mg) by oral route once daily before a meal for 30 days   DISP: (30) capsules with 2 refills  Refilled on 07/20/2020     o Carafate 1 gram oral tablet   SIG: take 1 tablet (1 gram) by oral route 4 times per day on an empty stomach 1 hour before meals and at bedtime for 30 days   DISP: (120) tablets with 0 refills  Discontinued on 07/20/2020     o Contrave 8-90 mg oral tablet extended release   SIG: take 1 tab daily for 1 week, increase to bid for 1 week, then 2 tabs qam and 1 tab at bedtime for 1 week, then 2 tabs bid thereafter   DISP: (120) tablets with 0 refills  Discontinued on 07/20/2020     o Medications have been Reconciled  o Transition of Care or Provider Policy  · Instructions  o Handouts were given to patient: low purine diet  o Patient was educated/instructed on their diagnosis, treatment and medications prior to discharge from the clinic today.  o Call the office with any concerns or questions.  o Electronically Identified Patient Education Materials Provided Electronically  · Disposition  o Return to Office in 1 month.            Electronically Signed by: MORENA Mary -Author on July 20, 2020 02:18:15 PM

## 2021-05-14 VITALS
TEMPERATURE: 97.6 F | DIASTOLIC BLOOD PRESSURE: 70 MMHG | HEIGHT: 71 IN | WEIGHT: 275.12 LBS | BODY MASS INDEX: 38.52 KG/M2 | HEART RATE: 78 BPM | SYSTOLIC BLOOD PRESSURE: 130 MMHG | OXYGEN SATURATION: 98 %

## 2021-05-14 NOTE — PROGRESS NOTES
"   Progress Note      Patient Name: Conrado An   Patient ID: [id number]   Sex: Male   YOB: 1995    Primary Care Provider: Yessenia BERG   Referring Provider: Yessenia BERG    Visit Date: March 8, 2021    Provider: MORENA Mary   Location: [facility]   Location Address: [address] [place] [address]  [id number]   Location Phone: [phone]          Chief Complaint  · 1 Month Follow up for Anxiety and Depression      History Of Present Illness  Video Conferencing Visit  Conrado An is a 25 year old /White,  or  male who is presenting for evaluation via video conferencing via [employer]. Verbal consent obtained before beginning visit.   The following staff were present during this visit: Erika Joseph CMA   Conrado An is a 25 year old /White,  or  male who presents for evaluation and treatment of:      1 Month Follow up for Anxiety and Depression.    Pt has been taking the Wellbutrin. \"not much of a change\".    Pt denies any weight loss but is working on low carb. Pt is increasing exercise 1-2 times per day if time will allow.       Past Medical History  Disease Name Date Onset Notes   GERD (gastroesophageal reflux disease) --  --    Lumbago/low back pain 01/19/2015 --    Pars defect of lumbar spine --  --          Past Surgical History  Procedure Name Date Notes   *Denies any surgical procedures --  --          Medication List  Name Date Started Instructions   Carafate 1 gram oral tablet 02/08/2021 take 1 tablet (1 gram) by oral route 4 times per day on an empty stomach 1 hour before meals and at bedtime for 30 days   multivitamin oral tablet  take 1 tablet by oral route daily   omeprazole 40 mg oral capsule,delayed release(DR/EC) 02/08/2021 take 1 capsule (40 mg) by oral route once daily before a meal for 30 days   Wellbutrin  mg oral tablet extended release 24 hr 02/08/2021 take 1 " tablet (150 mg) by oral route once daily for 30 days         Allergy List  Allergen Name Date Reaction Notes   NO KNOWN DRUG ALLERGIES --  --  --          Family Medical History  Disease Name Relative/Age Notes   Stroke  --    Cancer, Unspecified  --    Diabetes, unspecified type  --    Heart Attack (MI)  --          Social History  Finding Status Start/Stop Quantity Notes   Alcohol Never --/-- --  --    Tobacco Never --/-- --  --          Review of Systems  · Constitutional  o Denies  o : fatigue, fever, weight gain, weight loss, chills  · Cardiovascular  o Denies  o : chest Pain, palpitations, edema (swelling)  · Respiratory  o Denies  o : frequent cough, shortness of breath  · Gastrointestinal  o Denies  o : nausea, vomiting, changes in bowel habits  · Genitourinary  o Denies  o : dysuria, urinary frequency, urinary urgency, polyuria  · Endocrine  o Denies  o : polydipsia, polyphagia  · Psychiatric  o Admits  o : anxiety, depression  o Denies  o : mood changes, memory changes, SI/HI      Physical Examination  · Constitutional  o Appearance  o : well-nourished, in no acute distress          Assessment  · Anxiety disorder     300.00/F41.9  · Depression     311/F32.9  · Obesity     278.00/E66.9      Plan  · Orders  o ACO-39: Current medications updated and reviewed (1159F, ) - - 03/08/2021  · Medications  o Wellbutrin  mg oral tablet extended release 24 hr   SIG: take 1 tablet (300 mg) by oral route once daily for 30 days   DISP: (30) Tablet with 2 refills  Adjusted on 03/08/2021     o Medications have been Reconciled  o Transition of Care or Provider Policy  · Instructions  o Patient was educated/instructed on their diagnosis, treatment and medications prior to discharge from the clinic today.  o Call the office with any concerns or questions.  · Disposition  o Return to Office in 3 months.            Electronically Signed by: MORENA Mary -Author on March 8, 2021 07:57:52 AM

## 2021-05-14 NOTE — PROGRESS NOTES
Progress Note      Patient Name: Conrado An   Patient ID: [id number]   Sex: Male   YOB: 1995    Primary Care Provider: Yessenia BERG   Referring Provider: Yessenia BERG    Visit Date: February 8, 2021    Provider: MORENA Mary   Location: [facility]   Location Address: [address] [place] [address]  [id number]   Location Phone: [phone]          Chief Complaint  · Follow Up - GERD      History Of Present Illness  Conrado An is a 25 year old /White,  or  male who presents for evaluation and treatment of:      Follow Up - GERD    Pt states that the prescription for Carafate helps a lot.  Gerd - only flares if pt doesn't take medication.       Pt states that he wants to discuss something he can take for his weight, states that he discontinued Wellbutrin on his own because it didn't help or change anything. Pt reports he tried lean cuisine and didn't do very well. pt has cut down on sodas but has not quit then. Pt has increased water intake to 32 oz of water per day. Pt has no exercise routine.      Pt states he wants to discuss his depression, PHQ9 score is 12.   Pt feels like he has no motive to do anything.  Pt unsure if he snores. Pt reports he awakens rested. Pt doesn't awaken with h/a.       Past Medical History  Disease Name Date Onset Notes   GERD (gastroesophageal reflux disease) --  --    Lumbago/low back pain 01/19/2015 --    Pars defect of lumbar spine --  --          Past Surgical History  Procedure Name Date Notes   *Denies any surgical procedures --  --          Medication List  Name Date Started Instructions   Carafate 1 gram oral tablet 10/19/2020 take 1 tablet (1 gram) by oral route 4 times per day on an empty stomach 1 hour before meals and at bedtime for 30 days   multivitamin oral tablet  take 1 tablet by oral route daily   omeprazole 40 mg oral capsule,delayed release(/EC) 07/20/2020 take 1  "capsule (40 mg) by oral route once daily before a meal for 30 days         Allergy List  Allergen Name Date Reaction Notes   NO KNOWN DRUG ALLERGIES --  --  --        Allergies Reconciled  Family Medical History  Disease Name Relative/Age Notes   Stroke  --    Cancer, Unspecified  --    Diabetes, unspecified type  --    Heart Attack (MI)  --          Social History  Finding Status Start/Stop Quantity Notes   Alcohol Never --/-- --  --    Tobacco Never --/-- --  --          Review of Systems  · Constitutional  o Denies  o : fatigue  · HENT  o Denies  o : headaches  · Cardiovascular  o Denies  o : chest pain, irregular heart beats, rapid heart rate, dyspnea on exertion  · Respiratory  o Denies  o : shortness of breath, wheezing, cough  · Gastrointestinal  o Denies  o : nausea, vomiting, diarrhea, constipation, abdominal pain, blood in stools, melena  · Genitourinary  o Denies  o : frequency, dysuria, hematuria  · Integument  o Denies  o : rash, new skin lesions  · Musculoskeletal  o Denies  o : joint pain, joint swelling, muscle pain  · Endocrine  o Admits  o : central obesity  o Denies  o : polyuria, polydipsia  · Psychiatric  o Admits  o : anxiety, depression      Vitals  Date Time BP Position Site L\R Cuff Size HR RR TEMP (F) WT  HT  BMI kg/m2 BSA m2 O2 Sat FR L/min FiO2 HC       07/02/2020 02:34 /62 Sitting    77 - R 18 97.6 271lbs 0oz 5'  11\" 37.8 2.48 97 %      07/20/2020 01:31 /68 Sitting    95 - R 18 96.3 277lbs 0oz 5'  11\" 38.63 2.51 97 %      02/08/2021 07:29 /70 Sitting    78 - R  97.6 275lbs 2oz 5'  11\" 38.37 2.5 98 %  21%          Physical Examination  · Constitutional  o Appearance  o : well-nourished, in no acute distress  · Eyes  o Conjunctivae  o : conjunctivae normal  o Sclerae  o : sclerae white  o Pupils and Irises  o : pupils equal and round  o Eyelids/Ocular Adnexae  o : eyelid appearance normal, no exudates present  · Neck  o Inspection/Palpation  o : normal appearance, no " masses or tenderness, trachea midline  o Range of Motion  o : cervical range of motion within normal limits  o Thyroid  o : thyromegaly present, nontender, no nodules or masses present on palpation, symmetric  · Respiratory  o Respiratory Effort  o : breathing unlabored  o Inspection of Chest  o : normal appearance  o Auscultation of Lungs  o : normal breath sounds throughout inspiration and expiration  · Cardiovascular  o Heart  o :   § Auscultation of Heart  § : regular rate and rhythm, no murmurs, gallops or rubs  o Peripheral Vascular System  o :   § Carotid Arteries  § : normal pulses bilaterally, no bruits present  · Gastrointestinal  o Abdominal Examination  o : abdomen nontender to palpation, tone normal without rigidity or guarding, no masses present, bowel sounds present in all four quadrants  · Skin and Subcutaneous Tissue  o General Inspection  o : no rashes or lesions present, no areas of discoloration  o Body Hair  o : hair normal for age, general body hair distribution normal for age  o Digits and Nails  o : no clubbing, cyanosis, deformities or edema present, normal appearing nails  · Neurologic  o Mental Status Examination  o :   § Orientation  § : grossly oriented to person, place and time  o Gait and Station  o : normal gait, able to stand without difficulty  · Psychiatric  o Judgement and Insight  o : judgment and insight intact  o Mood and Affect  o : mood normal, affect appropriate          Assessment  · Screening for depression     V79.0/Z13.89  · Anxiety disorder     300.00/F41.9  · Depression     311/F32.9  · GERD (gastroesophageal reflux disease)     530.81/K21.9  · Class 2 severe obesity due to excess calories with serious comorbidity and body mass index (BMI) of 38.0 to 38.9 in adult       Morbid (severe) obesity due to excess calories     278.01/E66.01  Body mass index [BMI] 38.0-38.9, adult     278.01/Z68.38  · Routine lab draw     V72.60/Z01.89      Plan  · Orders  o Physical, Primary  Care Panel (CBC, CMP, Lipid, TSH) [facility] (13760, 07828, 71869, [address]809) - 278.01/Z68.38, 530.81/K21.9, V72.60/Z01.89 - 02/08/2021  o ACO-18: Positive screen for clinical depression using a standardized tool and a follow-up plan documented () - - 02/08/2021  o ACO-14: Influenza immunization was not administered for reasons documented [facility] () - - 02/08/2021   pt declined  o ACO-39: Current medications updated and reviewed (, 1159F) - - 02/08/2021  · Medications  o Wellbutrin  mg oral tablet extended release 24 hr   SIG: take 1 tablet (150 mg) by oral route once daily for 30 days   DISP: (30) Tablet with 1 refills  Prescribed on 02/08/2021     o Medications have been Reconciled  o Transition of Care or Provider Policy  · Instructions  o Depression Screen completed and scanned into the EMR under the designated folder within the patient's documents.  o Today's PHQ-9 result is _12  o Patient instructed/educated on their diet and exercise program.  o Patient was educated/instructed on their diagnosis, treatment and medications prior to discharge from the clinic today.  o Call the office with any concerns or questions.  · Disposition  o Return to Office in 1 month.            Electronically Signed by: MORENA Mary -Author on February 8, 2021 08:55:04 AM

## 2021-05-15 VITALS
SYSTOLIC BLOOD PRESSURE: 126 MMHG | OXYGEN SATURATION: 98 % | DIASTOLIC BLOOD PRESSURE: 62 MMHG | RESPIRATION RATE: 18 BRPM | BODY MASS INDEX: 38.01 KG/M2 | WEIGHT: 271.5 LBS | HEART RATE: 88 BPM | TEMPERATURE: 97.1 F | HEIGHT: 71 IN

## 2021-05-15 VITALS
HEIGHT: 71 IN | DIASTOLIC BLOOD PRESSURE: 62 MMHG | RESPIRATION RATE: 18 BRPM | SYSTOLIC BLOOD PRESSURE: 127 MMHG | BODY MASS INDEX: 37.94 KG/M2 | TEMPERATURE: 97.6 F | OXYGEN SATURATION: 97 % | WEIGHT: 271 LBS | HEART RATE: 77 BPM

## 2021-05-15 VITALS
HEIGHT: 71 IN | DIASTOLIC BLOOD PRESSURE: 70 MMHG | WEIGHT: 267.5 LBS | HEART RATE: 77 BPM | RESPIRATION RATE: 18 BRPM | OXYGEN SATURATION: 93 % | TEMPERATURE: 97.1 F | SYSTOLIC BLOOD PRESSURE: 125 MMHG | BODY MASS INDEX: 37.45 KG/M2

## 2021-05-15 VITALS
HEIGHT: 71 IN | HEART RATE: 95 BPM | SYSTOLIC BLOOD PRESSURE: 134 MMHG | OXYGEN SATURATION: 97 % | DIASTOLIC BLOOD PRESSURE: 68 MMHG | RESPIRATION RATE: 18 BRPM | WEIGHT: 277 LBS | TEMPERATURE: 96.3 F | BODY MASS INDEX: 38.78 KG/M2

## 2021-05-15 VITALS
BODY MASS INDEX: 37.38 KG/M2 | HEIGHT: 71 IN | SYSTOLIC BLOOD PRESSURE: 138 MMHG | WEIGHT: 267 LBS | HEART RATE: 82 BPM | DIASTOLIC BLOOD PRESSURE: 77 MMHG | TEMPERATURE: 98.4 F

## 2021-06-07 ENCOUNTER — OFFICE VISIT (OUTPATIENT)
Dept: GASTROENTEROLOGY | Facility: CLINIC | Age: 26
End: 2021-06-07

## 2021-06-07 VITALS
HEIGHT: 72 IN | WEIGHT: 266.4 LBS | HEART RATE: 84 BPM | SYSTOLIC BLOOD PRESSURE: 125 MMHG | BODY MASS INDEX: 36.08 KG/M2 | DIASTOLIC BLOOD PRESSURE: 73 MMHG

## 2021-06-07 DIAGNOSIS — K21.9 GASTROESOPHAGEAL REFLUX DISEASE, UNSPECIFIED WHETHER ESOPHAGITIS PRESENT: ICD-10-CM

## 2021-06-07 DIAGNOSIS — R11.2 NAUSEA AND VOMITING, INTRACTABILITY OF VOMITING NOT SPECIFIED, UNSPECIFIED VOMITING TYPE: ICD-10-CM

## 2021-06-07 DIAGNOSIS — R10.13 EPIGASTRIC PAIN: Primary | ICD-10-CM

## 2021-06-07 PROCEDURE — 99214 OFFICE O/P EST MOD 30 MIN: CPT | Performed by: NURSE PRACTITIONER

## 2021-06-07 RX ORDER — PANTOPRAZOLE SODIUM 40 MG/1
40 TABLET, DELAYED RELEASE ORAL DAILY
Qty: 90 TABLET | Refills: 1 | Status: SHIPPED | OUTPATIENT
Start: 2021-06-07 | End: 2021-06-08 | Stop reason: SDUPTHER

## 2021-06-07 RX ORDER — SUCRALFATE 1 G/1
1 TABLET ORAL 4 TIMES DAILY
COMMUNITY
Start: 2021-04-12 | End: 2022-01-22

## 2021-06-07 RX ORDER — FAMOTIDINE 20 MG/1
20 TABLET, FILM COATED ORAL 2 TIMES DAILY
Qty: 60 TABLET | Refills: 5 | Status: SHIPPED | OUTPATIENT
Start: 2021-06-07 | End: 2022-01-04 | Stop reason: SDUPTHER

## 2021-06-07 RX ORDER — OMEPRAZOLE 40 MG/1
40 CAPSULE, DELAYED RELEASE ORAL DAILY
COMMUNITY
Start: 2021-02-08 | End: 2021-06-07

## 2021-06-07 NOTE — PATIENT INSTRUCTIONS
Food Choices for Gastroesophageal Reflux Disease, Adult  When you have gastroesophageal reflux disease (GERD), the foods you eat and your eating habits are very important. Choosing the right foods can help ease your discomfort. Think about working with a food expert (dietitian) to help you make good choices.  What are tips for following this plan?  Reading food labels  1. Read the label for foods that are low in saturated fat. Foods that may help with your symptoms include:  ? Foods that have less than 5% of daily value (DV) of fat.  ? Foods that have 0 grams of trans fat.  Cooking  · Do not jauregui your food. Cook your food by baking, steaming, grilling, or broiling. These are all methods that do not need a lot of fat for cooking.  · To add flavor, try to use herbs that are low in spice and acidity.  Meal planning    1. Choose healthy foods that are low in fat, such as:  ? Fruits and vegetables.  ? Whole grains.  ? Low-fat dairy products.  ? Lean meats, fish, and poultry.  2. Eat small meals often instead of eating 3 large meals each day. Eat your meals slowly in a place where you are relaxed. Avoid bending over or lying down until 2-3 hours after eating.  3. Limit high-fat foods such as fatty meats or fried foods.  4. Limit your intake of oils, butter, and shortening to less than 8 teaspoons each day.  5. Avoid the following:  ? Foods that cause symptoms. These may be different for different people. Keep a food diary to keep track of foods that cause symptoms.  ? Alcohol.  ? Drinking a lot of liquid with meals.  ? Eating meals during the 2-3 hours before bed.  Lifestyle  · Stay at a healthy weight. Ask your doctor what weight is healthy for you. If you need to lose weight, work with your doctor to do so safely.  · Exercise for at least 30 minutes on 5 or more days each week, or as told by your doctor.  · Wear loose-fitting clothes.  · Do not use any products that contain nicotine or tobacco, such as cigarettes,  e-cigarettes, and chewing tobacco. If you need help quitting, ask your doctor.  · Sleep with the head of your bed higher than your feet. Use a wedge under the mattress or blocks under the bed frame to raise the head of the bed.  What foods should eat?    Eat a healthy, well-balanced diet of fruits, vegetables, whole grains, low-fat dairy products, lean meats, fish, and poultry. Each person is different. Foods that may cause symptoms in one person may not cause any symptoms in another person. Work with your doctor to find foods that are safe for you.  The items listed above may not be a complete list of foods and beverages you can eat. Contact a dietitian for more information.  What foods should I avoid?  Limiting some of these foods may help in managing the symptoms of GERD. Everyone is different. Talk with a food expert or your doctor to help you find the exact foods to avoid, if any.  Fruits  Any fruits prepared with added fat. Any fruits that cause symptoms. For some people, this may include citrus fruits, such as oranges, grapefruit, pineapple, and gennaro.  Vegetables  Deep-fried vegetables. French fries. Any vegetables prepared with added fat. Any vegetables that cause symptoms. For some people, this may include tomatoes and tomato products, chili peppers, onions and garlic, and horseradish.  Grains  Pastries or quick breads with added fat.  Meats and other proteins  High-fat meats, such as fatty beef or pork, hot dogs, ribs, ham, sausage, salami, and thomson. Fried meat or protein, including fried fish and fried chicken. Nuts and nut butters.  Dairy  Whole milk and chocolate milk. Sour cream. Cream. Ice cream. Cream cheese. Milkshakes.  Fats and oils  Butter. Margarine. Shortening. Ghee.  Beverages  Coffee and tea, with or without caffeine. Carbonated beverages. Sodas. Energy drinks. Fruit juice made with acidic fruits (such as orange or grapefruit). Tomato juice. Alcoholic drinks.  Sweets and  desserts  Chocolate and cocoa. Donuts.  Seasonings and condiments  Pepper. Peppermint and spearmint. Added salt. Any condiments, herbs, or seasonings that cause symptoms. For some people, this may include lester, hot sauce, or vinegar-based salad dressings.  The items listed above may not be a complete list of foods and beverages you should avoid. Contact a dietitian for more information.  Questions to ask your doctor  Diet and lifestyle changes are often the first steps that are taken to manage symptoms of GERD. If diet and lifestyle changes do not help, talk with your doctor about taking medicines.  Where to find more information  · [employer]: aboutgerd.org  Summary  · When you have GERD, food and lifestyle choices are very important in easing your symptoms.  · Eat small meals often instead of 3 large meals a day. Eat your meals slowly and in a place where you are relaxed.  · Avoid bending over or lying down until 2-3 hours after eating.  · Limit high-fat foods such as fatty meat or fried foods.  This information is not intended to replace advice given to you by your health care provider. Make sure you discuss any questions you have with your health care provider.  Document Revised: 10/12/2020 Document Reviewed: 10/12/2020  Elsevier Patient Education © 2021 [employer] Inc[employer]  Upper Endoscopy, Adult  Upper endoscopy is a procedure to look inside the upper GI (gastrointestinal) tract. The upper GI tract is made up of:  · The part of the body that moves food from your mouth to your stomach (esophagus).  · The stomach.  · The first part of your small intestine (duodenum).  This procedure is also called esophagogastroduodenoscopy (EGD) or gastroscopy. In this procedure, your health care provider passes a thin, flexible tube (endoscope) through your mouth and down your esophagus into your stomach. A small camera is attached to the end of the tube. Images from the camera appear on  a monitor in the exam room. During this procedure, your health care provider may also remove a small piece of tissue to be sent to a lab and examined under a microscope (biopsy).  Your health care provider may do an upper endoscopy to diagnose cancers of the upper GI tract. You may also have this procedure to find the cause of other conditions, such as:  · Stomach pain.  · Heartburn.  · Pain or problems when swallowing.  · Nausea and vomiting.  · Stomach bleeding.  · Stomach ulcers.  Tell a health care provider about:  · Any allergies you have.  · All medicines you are taking, including vitamins, herbs, eye drops, creams, and over-the-counter medicines.  · Any problems you or family members have had with anesthetic medicines.  · Any blood disorders you have.  · Any surgeries you have had.  · Any medical conditions you have.  · Whether you are pregnant or may be pregnant.  What are the risks?  Generally, this is a safe procedure. However, problems may occur, including:  · Infection.  · Bleeding.  · Allergic reactions to medicines.  · A tear or hole (perforation) in the esophagus, stomach, or duodenum.  What happens before the procedure?  Staying hydrated  Follow instructions from your health care provider about hydration, which may include:  · Up to 2 hours before the procedure - you may continue to drink clear liquids, such as water, clear fruit juice, black coffee, and plain tea.    Eating and drinking restrictions  Follow instructions from your health care provider about eating and drinking, which may include:  · 8 hours before the procedure - stop eating heavy meals or foods, such as meat, fried foods, or fatty foods.  · 6 hours before the procedure - stop eating light meals or foods, such as toast or cereal.  · 6 hours before the procedure - stop drinking milk or drinks that contain milk.  · 2 hours before the procedure - stop drinking clear liquids.  Medicines  Ask your health care provider about:  · Changing  or stopping your regular medicines. This is especially important if you are taking diabetes medicines or blood thinners.  · Taking medicines such as aspirin and ibuprofen. These medicines can thin your blood. Do not take these medicines unless your health care provider tells you to take them.  · Taking over-the-counter medicines, vitamins, herbs, and supplements.  General instructions  · Plan to have someone take you home from the hospital or clinic.  · If you will be going home right after the procedure, plan to have someone with you for 24 hours.  · Ask your health care provider what steps will be taken to help prevent infection.  What happens during the procedure?    1. An IV will be inserted into one of your veins.  2. You may be given one or more of the following:  ? A medicine to help you relax (sedative).  ? A medicine to numb the throat (local anesthetic).  3. You will lie on your left side on an exam table.  4. Your health care provider will pass the endoscope through your mouth and down your esophagus.  5. Your health care provider will use the scope to check the inside of your esophagus, stomach, and duodenum. Biopsies may be taken.  6. The endoscope will be removed.  The procedure may vary among health care providers and hospitals.  What happens after the procedure?  · Your blood pressure, heart rate, breathing rate, and blood oxygen level will be monitored until you leave the hospital or clinic.  · Do not drive for 24 hours if you were given a sedative during your procedure.  · When your throat is no longer numb, you may be given some fluids to drink.  · It is up to you to get the results of your procedure. Ask your health care provider, or the department that is doing the procedure, when your results will be ready.  Summary  · Upper endoscopy is a procedure to look inside the upper GI tract.  · During the procedure, an IV will be inserted into one of your veins. You may be given a medicine to help you  relax.  · A medicine will be used to numb your throat.  · The endoscope will be passed through your mouth and down your esophagus.  This information is not intended to replace advice given to you by your health care provider. Make sure you discuss any questions you have with your health care provider.  Document Revised: 06/12/2019 Document Reviewed: 05/20/2019  [employer] Patient Education © 2021 Elsevier Inc.

## 2021-06-07 NOTE — PROGRESS NOTES
"Chief Complaint  Heartburn, Abdominal Pain, and Nausea    Conrado An is a 25 y.o. male who presents to [facility] on referral from MORENA Mary for a gastroenterology evaluation of episodic nausea, vomiting, diarrhea and epigastric pain..    He reports experiencing episodes of nausea and vomiting beginning in 2009.  He states that symptoms were only present occasionally.  However, more recently episodes have become more frequent.  He states that he is now experiencing episodes of nausea vomiting and diarrhea every other week.  He was prescribed omeprazole 40 mg daily per primary care provider and initially reported improvement with this.  However, it lost effectiveness and Carafate was added.  He states \"Carafate was a miracle.  \"    He now takes Carafate he experiences epigastric pain, belching, diarrhea and vomitting.  Often experiences burning in epigastric region.      Admtis heartburn that is improved with omeprazole daily.  He is currently out of omeprazole and reports constant heartburn.      Denie previous EGD.      Admits previous colonoscopy in 2012.  Normal per patient.      Diarrhea is present with every epsidoe of vomitting.      Baseline bowel pattern is a daily bowel movement without rectal bleeding.      Past Medical History:   Diagnosis Date   • GERD (gastroesophageal reflux disease)    • Injury of back     prior resolved lumbar injury   • Lumbago with sciatica 01/19/2015   • Pars defect of lumbar spine        History reviewed. No pertinent surgical history.      Current Outpatient Medications:   •  sucralfate (Carafate) 1 g tablet, Take 1 g by mouth 4 (Four) Times a Day., Disp: , Rfl:   •  famotidine (PEPCID) 20 MG tablet, Take 1 tablet by mouth 2 (Two) Times a Day., Disp: 60 tablet, Rfl: 5  •  pantoprazole (Protonix) 40 MG EC tablet, Take 1 tablet by mouth Daily for 90 days., Disp: 90 tablet, Rfl: 1     No Known Allergies    Family History " "  Problem Relation Age of Onset   • Stroke Other    • Cancer Other         unspecified   • Diabetes Other         unpecified typr   • Heart attack Other         MI        Social History     Social History Narrative   • Not on file       Immunization:    There is no immunization history on file for this patient.     Objective     Review of Systems   Constitutional: Negative for fever and unexpected weight loss.   Eyes: Negative for blurred vision and double vision.   Respiratory: Negative for cough and shortness of breath.    Cardiovascular: Negative for chest pain and palpitations.   Gastrointestinal:        See HPI   Genitourinary: Negative for dysuria and hematuria.   Musculoskeletal: Negative for gait problem and joint swelling.   Skin: Negative for rash and skin lesions.   Neurological: Negative for seizures, weakness, numbness and confusion.   Psychiatric/Behavioral: Negative for sleep disturbance and depressed mood.        Vital Signs:   /73 (BP Location: Left arm, Patient Position: Sitting, Cuff Size: Adult)   Pulse 84   Ht 182.9 cm (72\")   Wt 121 kg (266 lb 6.4 oz)   BMI 36.13 kg/m²       Physical Exam  Constitutional:       General: He is not in acute distress.     Appearance: Normal appearance. He is well-developed and normal weight.   HENT:      Head: Normocephalic and atraumatic.   Eyes:      Conjunctiva/sclera: Conjunctivae normal.      Pupils: Pupils are equal, round, and reactive to light.      Visual Fields: Right eye visual fields normal and left eye visual fields normal.   Cardiovascular:      Rate and Rhythm: Normal rate and regular rhythm.      Heart sounds: Normal heart sounds.   Pulmonary:      Effort: Pulmonary effort is normal. No retractions.      Breath sounds: Normal breath sounds and air entry.   Abdominal:      General: Bowel sounds are normal.      Palpations: Abdomen is soft.      Tenderness: There is abdominal tenderness (epigastric).      Comments: No appreciable " hepatosplenomegaly or ascites   Musculoskeletal:         General: Normal range of motion.      Cervical back: Neck supple.      Right lower leg: No edema.      Left lower leg: No edema.   Lymphadenopathy:      Cervical: No cervical adenopathy.   Skin:     General: Skin is warm and dry.      Findings: No lesion.   Neurological:      General: No focal deficit present.      Mental Status: He is alert and oriented to person, place, and time.   Psychiatric:         Mood and Affect: Mood and affect normal.         Behavior: Behavior normal.         Result Review :           Data reviewed: Radiologic studies :     NM Hepatobiliary Without CCK (07/13/2020 13:28)    Ejection fraction 59% at 60 minutes.    US Abdomen Limited (07/13/2020 09:36)    Hepatomegaly with diffuse fatty infiltration.  Borderline common bile duct of 5 mm.  Normal-appearing gallbladder and biliary tree.         Assessment and Plan    Diagnoses and all orders for this visit:    1. Epigastric pain (Primary)  -     pantoprazole (Protonix) 40 MG EC tablet; Take 1 tablet by mouth Daily for 90 days.  Dispense: 90 tablet; Refill: 1  -     famotidine (PEPCID) 20 MG tablet; Take 1 tablet by mouth 2 (Two) Times a Day.  Dispense: 60 tablet; Refill: 5  -     Case Request; Standing  -     Case Request  -     COVID PRE-OP / PRE-PROCEDURE SCREENING ORDER (NO ISOLATION) - Swab, Nasopharynx; Future    2. Nausea and vomiting, intractability of vomiting not specified, unspecified vomiting type  -     pantoprazole (Protonix) 40 MG EC tablet; Take 1 tablet by mouth Daily for 90 days.  Dispense: 90 tablet; Refill: 1  -     famotidine (PEPCID) 20 MG tablet; Take 1 tablet by mouth 2 (Two) Times a Day.  Dispense: 60 tablet; Refill: 5  -     Case Request; Standing  -     Case Request  -     COVID PRE-OP / PRE-PROCEDURE SCREENING ORDER (NO ISOLATION) - Swab, Nasopharynx; Future    3. Gastroesophageal reflux disease, unspecified whether esophagitis present  -     pantoprazole  (Protonix) 40 MG EC tablet; Take 1 tablet by mouth Daily for 90 days.  Dispense: 90 tablet; Refill: 1  -     famotidine (PEPCID) 20 MG tablet; Take 1 tablet by mouth 2 (Two) Times a Day.  Dispense: 60 tablet; Refill: 5  -     Case Request; Standing  -     Case Request  -     COVID PRE-OP / PRE-PROCEDURE SCREENING ORDER (NO ISOLATION) - Swab, Nasopharynx; Future    EGD to further work-up patient's symptoms.  Change PPI to Protonix and add Pepcid.  Reflux precautions discussed with patient including avoiding high acid foods, small meals, n.p.o. 4 hours prior to bedtime and avoiding alcohol and caffeine.      Follow Up   Return for f/u after procedure.  Patient was given instructions and counseling regarding his condition or for health maintenance advice. Please see specific information pulled into the AVS if appropriate.

## 2021-06-08 DIAGNOSIS — K21.9 GASTROESOPHAGEAL REFLUX DISEASE, UNSPECIFIED WHETHER ESOPHAGITIS PRESENT: ICD-10-CM

## 2021-06-08 DIAGNOSIS — R11.2 NAUSEA AND VOMITING, INTRACTABILITY OF VOMITING NOT SPECIFIED, UNSPECIFIED VOMITING TYPE: ICD-10-CM

## 2021-06-08 DIAGNOSIS — R10.13 EPIGASTRIC PAIN: ICD-10-CM

## 2021-06-08 RX ORDER — PANTOPRAZOLE SODIUM 40 MG/1
40 TABLET, DELAYED RELEASE ORAL DAILY
Qty: 90 TABLET | Refills: 1 | Status: SHIPPED | OUTPATIENT
Start: 2021-06-08 | End: 2021-09-06

## 2021-06-15 ENCOUNTER — TELEPHONE (OUTPATIENT)
Dept: GASTROENTEROLOGY | Facility: CLINIC | Age: 26
End: 2021-06-15

## 2021-06-15 NOTE — TELEPHONE ENCOUNTER
----- Message from Rina Calix sent at 6/8/2021  3:56 PM EDT -----  Regarding: Med Denial  Pantoprazole dr 40mg was denied by his insurance. They will cover omeprazole dr 40mg, pantoprazole ec 20mg and pantoprazole ec 40mg, esomeprazole dr 20mg and esomeprazole dr 40mg. Please advise.

## 2021-06-25 ENCOUNTER — TELEPHONE (OUTPATIENT)
Dept: FAMILY MEDICINE CLINIC | Facility: CLINIC | Age: 26
End: 2021-06-25

## 2021-06-28 ENCOUNTER — TELEPHONE (OUTPATIENT)
Dept: FAMILY MEDICINE CLINIC | Facility: CLINIC | Age: 26
End: 2021-06-28

## 2021-06-28 NOTE — TELEPHONE ENCOUNTER
Caller: Conrado An    Relationship: Self    Best call back number: [phone]      What was the call regarding: PATIENTS WIFE STATES SHE DROPPED OF FMLA PAPWORK THREE WEEKS AGO AND JUST WANT TO KNOW IF IT IS READY TO BE PICKED UP. WAS UNABLE TO WARM TRANSFER. PLEASE ADVISE    Do you require a callback: YES

## 2021-07-07 NOTE — TELEPHONE ENCOUNTER
Phone numbers ending in [phone] are disconnected. Left a voicemail for pt on [phone] phone number - we have questions regarding paperwork before completion.

## 2021-07-08 NOTE — TELEPHONE ENCOUNTER
Spoke to pt - confirmed phone number and advised him that we will contact him when paperwork is completed.

## 2021-08-13 ENCOUNTER — LAB (OUTPATIENT)
Dept: LAB | Facility: HOSPITAL | Age: 26
End: 2021-08-13

## 2021-08-13 DIAGNOSIS — R10.13 EPIGASTRIC PAIN: ICD-10-CM

## 2021-08-13 DIAGNOSIS — K21.9 GASTROESOPHAGEAL REFLUX DISEASE, UNSPECIFIED WHETHER ESOPHAGITIS PRESENT: ICD-10-CM

## 2021-08-13 DIAGNOSIS — R11.2 NAUSEA AND VOMITING, INTRACTABILITY OF VOMITING NOT SPECIFIED, UNSPECIFIED VOMITING TYPE: ICD-10-CM

## 2021-08-13 PROCEDURE — C9803 HOPD COVID-19 SPEC COLLECT: HCPCS

## 2021-08-13 PROCEDURE — U0003 INFECTIOUS AGENT DETECTION BY NUCLEIC ACID (DNA OR RNA); SEVERE ACUTE RESPIRATORY SYNDROME CORONAVIRUS 2 (SARS-COV-2) (CORONAVIRUS DISEASE [COVID-19]), AMPLIFIED PROBE TECHNIQUE, MAKING USE OF HIGH THROUGHPUT TECHNOLOGIES AS DESCRIBED BY CMS-2020-01-R: HCPCS

## 2021-08-14 LAB — SARS-COV-2 RNA RESP QL NAA+PROBE: NOT DETECTED

## 2021-08-18 ENCOUNTER — ANESTHESIA (OUTPATIENT)
Dept: GASTROENTEROLOGY | Facility: HOSPITAL | Age: 26
End: 2021-08-18

## 2021-08-18 ENCOUNTER — HOSPITAL ENCOUNTER (OUTPATIENT)
Facility: HOSPITAL | Age: 26
Setting detail: HOSPITAL OUTPATIENT SURGERY
Discharge: HOME | End: 2021-08-18
Attending: INTERNAL MEDICINE | Admitting: INTERNAL MEDICINE

## 2021-08-18 ENCOUNTER — ANESTHESIA EVENT (OUTPATIENT)
Dept: GASTROENTEROLOGY | Facility: HOSPITAL | Age: 26
End: 2021-08-18

## 2021-08-18 VITALS
OXYGEN SATURATION: 96 % | HEART RATE: 65 BPM | WEIGHT: 268.52 LBS | BODY MASS INDEX: 37.59 KG/M2 | HEIGHT: 71 IN | TEMPERATURE: 97 F | DIASTOLIC BLOOD PRESSURE: 86 MMHG | SYSTOLIC BLOOD PRESSURE: 120 MMHG | RESPIRATION RATE: 14 BRPM

## 2021-08-18 DIAGNOSIS — K21.9 GASTROESOPHAGEAL REFLUX DISEASE, UNSPECIFIED WHETHER ESOPHAGITIS PRESENT: ICD-10-CM

## 2021-08-18 DIAGNOSIS — R10.13 EPIGASTRIC PAIN: ICD-10-CM

## 2021-08-18 DIAGNOSIS — R11.2 NAUSEA AND VOMITING, INTRACTABILITY OF VOMITING NOT SPECIFIED, UNSPECIFIED VOMITING TYPE: ICD-10-CM

## 2021-08-18 PROCEDURE — 25010000002 PROPOFOL 10 MG/ML EMULSION: Performed by: NURSE ANESTHETIST, CERTIFIED REGISTERED

## 2021-08-18 PROCEDURE — 88305 TISSUE EXAM BY PATHOLOGIST: CPT | Performed by: INTERNAL MEDICINE

## 2021-08-18 PROCEDURE — 43239 EGD BIOPSY SINGLE/MULTIPLE: CPT | Performed by: INTERNAL MEDICINE

## 2021-08-18 RX ORDER — SODIUM CHLORIDE, SODIUM LACTATE, POTASSIUM CHLORIDE, CALCIUM CHLORIDE 600; 310; 30; 20 MG/100ML; MG/100ML; MG/100ML; MG/100ML
30 INJECTION, SOLUTION INTRAVENOUS CONTINUOUS
Status: DISCONTINUED | OUTPATIENT
Start: 2021-08-18 | End: 2021-08-18 | Stop reason: HOSPADM

## 2021-08-18 RX ORDER — LIDOCAINE HYDROCHLORIDE 20 MG/ML
INJECTION, SOLUTION INFILTRATION; PERINEURAL AS NEEDED
Status: DISCONTINUED | OUTPATIENT
Start: 2021-08-18 | End: 2021-08-18 | Stop reason: SURG

## 2021-08-18 RX ORDER — PROPOFOL 10 MG/ML
VIAL (ML) INTRAVENOUS AS NEEDED
Status: DISCONTINUED | OUTPATIENT
Start: 2021-08-18 | End: 2021-08-18 | Stop reason: SURG

## 2021-08-18 RX ADMIN — LIDOCAINE HYDROCHLORIDE 100 MG: 20 INJECTION, SOLUTION INFILTRATION; PERINEURAL at 13:15

## 2021-08-18 RX ADMIN — SODIUM CHLORIDE, POTASSIUM CHLORIDE, SODIUM LACTATE AND CALCIUM CHLORIDE 30 ML/HR: 600; 310; 30; 20 INJECTION, SOLUTION INTRAVENOUS at 13:02

## 2021-08-18 RX ADMIN — PROPOFOL 100 MG: 10 INJECTION, EMULSION INTRAVENOUS at 13:14

## 2021-08-18 RX ADMIN — PROPOFOL 200 MCG/KG/MIN: 10 INJECTION, EMULSION INTRAVENOUS at 13:14

## 2021-08-18 NOTE — ANESTHESIA PREPROCEDURE EVALUATION
Anesthesia Evaluation     Patient summary reviewed and Nursing notes reviewed   no history of anesthetic complications:  NPO Solid Status: > 8 hours  NPO Liquid Status: > 2 hours           Airway   Mallampati: II  TM distance: >3 FB  Neck ROM: full  No difficulty expected  Dental      Pulmonary - negative pulmonary ROS and normal exam    breath sounds clear to auscultation  Cardiovascular - negative cardio ROS and normal exam  Exercise tolerance: good (4-7 METS)    Rhythm: regular        Neuro/Psych- negative ROS  GI/Hepatic/Renal/Endo    (+) obesity,  GERD,      Musculoskeletal (-) negative ROS    Abdominal    Substance History - negative use     OB/GYN negative ob/gyn ROS         Other - negative ROS                       Anesthesia Plan    ASA 2     general   total IV anesthesia    Anesthetic plan, all risks, benefits, and alternatives have been provided, discussed and informed consent has been obtained with: patient.

## 2021-08-18 NOTE — ANESTHESIA POSTPROCEDURE EVALUATION
Patient: Conrado An    Procedure Summary     Date: 08/18/21 Room / Location: [facility] ENDOSCOPY 4 / [facility] ENDOSCOPY    Anesthesia Start: 1311 Anesthesia Stop: 1335    Procedure: ESOPHAGOGASTRODUODENOSCOPY with biopsies (N/A ) Diagnosis:       Epigastric pain      Nausea and vomiting, intractability of vomiting not specified, unspecified vomiting type      Gastroesophageal reflux disease, unspecified whether esophagitis present      (Epigastric pain [R10.13])      (Nausea and vomiting, intractability of vomiting not specified, unspecified vomiting type [R11.2])      (Gastroesophageal reflux disease, unspecified whether esophagitis present [K21.9])    Surgeons: Nessa Carrero MD Provider: Kd Larsen MD    Anesthesia Type: general ASA Status: 2          Anesthesia Type: general    Vitals  Vitals Value Taken Time   /64 08/18/21 1339   Temp 36.1 °C (97 °F) 08/18/21 1334   Pulse 72 08/18/21 1339   Resp 14 08/18/21 1339   SpO2 98 % 08/18/21 1339           Post Anesthesia Care and Evaluation    Patient location during evaluation: bedside  Patient participation: complete - patient participated  Level of consciousness: awake  Pain score: 0  Pain management: adequate  Airway patency: patent  Anesthetic complications: No anesthetic complications  PONV Status: none  Cardiovascular status: acceptable and stable  Respiratory status: acceptable and room air  Hydration status: acceptable    Comments: An Anesthesiologist personally participated in the most demanding procedures (including induction and emergence if applicable) in the anesthesia plan, monitored the course of anesthesia administration at frequent intervals and remained physically present and available for immediate diagnosis and treatment of emergencies.

## 2021-08-18 NOTE — H&P
"Pre Procedure History & Physical    Chief Complaint:   Nausea, vomiting, epigastric pain, GERD    Subjective     HPI:   25 yo M here for eval of nausea, vomiting, epigastric pain, GERD.    Past Medical History:   Past Medical History:   Diagnosis Date   • GERD (gastroesophageal reflux disease)    • Injury of back     prior resolved lumbar injury   • Lumbago with sciatica 01/19/2015   • Pars defect of lumbar spine        Past Surgical History:  Past Surgical History:   Procedure Laterality Date   • COLONOSCOPY         Family History:  Family History   Problem Relation Age of Onset   • Stroke Other    • Cancer Other         unspecified   • Diabetes Other         unpecified typr   • Heart attack Other         MI   • Malig Hyperthermia Neg Hx        Social History:   reports that he has never smoked. He does not have any smokeless tobacco history on file. He reports that he does not drink alcohol and does not use drugs.    Medications:   Medications Prior to Admission   Medication Sig Dispense Refill Last Dose   • famotidine (PEPCID) 20 MG tablet Take 1 tablet by mouth 2 (Two) Times a Day. 60 tablet 5    • pantoprazole (Protonix) 40 MG EC tablet Take 1 tablet by mouth Daily for 90 days. (Patient taking differently: Take 40 mg by mouth Every Night.) 90 tablet 1    • sucralfate (Carafate) 1 g tablet Take 1 g by mouth 4 (Four) Times a Day.          Allergies:  Patient has no known allergies.    ROS:    Pertinent items are noted in HPI     Objective     Height 180.3 cm (70.98\"), weight 122 kg (268 lb 8.3 oz).    Physical Exam   Constitutional: Pt is oriented to person, place, and time and well-developed, well-nourished, and in no distress.   Mouth/Throat: Oropharynx is clear and moist.   Neck: Normal range of motion.   Cardiovascular: Normal rate, regular rhythm and normal heart sounds.    Pulmonary/Chest: Effort normal and breath sounds normal.   Abdominal: Soft. Nontender  Skin: Skin is warm and dry.   Psychiatric: Mood, " memory, affect and judgment normal.     Assessment/Plan     Diagnosis:  Nausea, vomiting, epigastric pain, GERD    Anticipated Surgical Procedure:  EGD    The risks, benefits, and alternatives of this procedure have been discussed with the patient or the responsible party- the patient understands and agrees to proceed.

## 2021-08-20 ENCOUNTER — TELEPHONE (OUTPATIENT)
Dept: GASTROENTEROLOGY | Facility: CLINIC | Age: 26
End: 2021-08-20

## 2021-08-20 LAB
CYTO UR: NORMAL
LAB AP CASE REPORT: NORMAL
LAB AP CLINICAL INFORMATION: NORMAL
PATH REPORT.FINAL DX SPEC: NORMAL
PATH REPORT.GROSS SPEC: NORMAL

## 2021-08-20 NOTE — TELEPHONE ENCOUNTER
----- Message from MORENA Mckay sent at 8/20/2021 12:08 PM EDT -----  Gastric biopsies normal.  Please schedule for f/u.

## 2021-08-27 ENCOUNTER — TELEPHONE (OUTPATIENT)
Dept: GASTROENTEROLOGY | Facility: CLINIC | Age: 26
End: 2021-08-27

## 2021-09-10 ENCOUNTER — TELEPHONE (OUTPATIENT)
Dept: GASTROENTEROLOGY | Facility: CLINIC | Age: 26
End: 2021-09-10

## 2021-09-10 NOTE — TELEPHONE ENCOUNTER
Called patient and number was unavailable to leave a voicemail. This was the third call to contact patient. A letter will be sent to patient requesting a call to the office.

## 2022-01-04 DIAGNOSIS — K21.9 GASTROESOPHAGEAL REFLUX DISEASE, UNSPECIFIED WHETHER ESOPHAGITIS PRESENT: ICD-10-CM

## 2022-01-04 DIAGNOSIS — R11.2 NAUSEA AND VOMITING, INTRACTABILITY OF VOMITING NOT SPECIFIED, UNSPECIFIED VOMITING TYPE: ICD-10-CM

## 2022-01-04 DIAGNOSIS — R10.13 EPIGASTRIC PAIN: ICD-10-CM

## 2022-01-04 RX ORDER — FAMOTIDINE 20 MG/1
20 TABLET, FILM COATED ORAL 2 TIMES DAILY
Qty: 60 TABLET | Refills: 5 | Status: SHIPPED | OUTPATIENT
Start: 2022-01-04 | End: 2022-09-29 | Stop reason: SDUPTHER

## 2022-06-02 RX ORDER — PANTOPRAZOLE SODIUM 40 MG/1
TABLET, DELAYED RELEASE ORAL
Qty: 90 TABLET | Refills: 0 | Status: SHIPPED | OUTPATIENT
Start: 2022-06-02

## 2022-06-03 RX ORDER — PANTOPRAZOLE SODIUM 40 MG/1
40 TABLET, DELAYED RELEASE ORAL DAILY
Qty: 90 TABLET | Refills: 0 | OUTPATIENT
Start: 2022-06-03

## 2022-06-03 NOTE — TELEPHONE ENCOUNTER
Caller: Conrado An    Relationship to patient: Self    Best call back number: [phone]    Patient is needing: PATIENT CALLED TO REQUEST REFILL ON famotidine (PEPCID) 20 MG tablet  IT COULD NOT BE SENT AS REFILL REQUEST STATED UPDATE ON SYMPTOMS NEEDED PLEASE CONTACT PATIENT AND ADVISE

## 2022-08-16 RX ORDER — PANTOPRAZOLE SODIUM 40 MG/1
TABLET, DELAYED RELEASE ORAL
Qty: 90 TABLET | Refills: 0 | OUTPATIENT
Start: 2022-08-16

## 2022-09-29 DIAGNOSIS — U07.1 COVID-19 VIRUS INFECTION: ICD-10-CM

## 2022-09-29 DIAGNOSIS — K21.9 GASTROESOPHAGEAL REFLUX DISEASE, UNSPECIFIED WHETHER ESOPHAGITIS PRESENT: ICD-10-CM

## 2022-09-29 DIAGNOSIS — R10.13 EPIGASTRIC PAIN: ICD-10-CM

## 2022-09-29 DIAGNOSIS — R11.2 NAUSEA AND VOMITING: ICD-10-CM

## 2022-09-29 RX ORDER — FAMOTIDINE 20 MG/1
20 TABLET, FILM COATED ORAL 2 TIMES DAILY
Qty: 60 TABLET | Refills: 5 | Status: SHIPPED | OUTPATIENT
Start: 2022-09-29

## 2022-09-29 RX ORDER — METHYLPREDNISOLONE 4 MG/1
TABLET ORAL
Qty: 21 EACH | Refills: 0 | OUTPATIENT
Start: 2022-09-29

## 2024-01-22 ENCOUNTER — OFFICE VISIT (OUTPATIENT)
Dept: FAMILY MEDICINE CLINIC | Facility: CLINIC | Age: 29
End: 2024-01-22
Payer: COMMERCIAL

## 2024-01-22 VITALS
HEART RATE: 77 BPM | TEMPERATURE: 96.9 F | SYSTOLIC BLOOD PRESSURE: 133 MMHG | OXYGEN SATURATION: 97 % | BODY MASS INDEX: 35.46 KG/M2 | DIASTOLIC BLOOD PRESSURE: 84 MMHG | WEIGHT: 261.8 LBS | HEIGHT: 72 IN

## 2024-01-22 DIAGNOSIS — Z13.29 SCREENING FOR THYROID DISORDER: ICD-10-CM

## 2024-01-22 DIAGNOSIS — M79.675 GREAT TOE PAIN, LEFT: Primary | ICD-10-CM

## 2024-01-22 DIAGNOSIS — Z13.220 LIPID SCREENING: ICD-10-CM

## 2024-01-22 DIAGNOSIS — M25.50 ARTHRALGIA, UNSPECIFIED JOINT: ICD-10-CM

## 2024-01-22 DIAGNOSIS — Z11.59 NEED FOR HEPATITIS C SCREENING TEST: ICD-10-CM

## 2024-01-22 LAB
ALBUMIN SERPL-MCNC: 4.9 G/DL (ref 3.5–5.2)
ALBUMIN/GLOB SERPL: 2.1 G/DL
ALP SERPL-CCNC: 68 U/L (ref 39–117)
ALT SERPL W P-5'-P-CCNC: 51 U/L (ref 1–41)
ANION GAP SERPL CALCULATED.3IONS-SCNC: 10 MMOL/L (ref 5–15)
AST SERPL-CCNC: 22 U/L (ref 1–40)
BILIRUB SERPL-MCNC: 0.7 MG/DL (ref 0–1.2)
BUN SERPL-MCNC: 10 MG/DL (ref 6–20)
BUN/CREAT SERPL: 8.5 (ref 7–25)
CALCIUM SPEC-SCNC: 10 MG/DL (ref 8.6–10.5)
CHLORIDE SERPL-SCNC: 107 MMOL/L (ref 98–107)
CHOLEST SERPL-MCNC: 145 MG/DL (ref 0–200)
CHROMATIN AB SERPL-ACNC: <10 IU/ML (ref 0–14)
CO2 SERPL-SCNC: 27 MMOL/L (ref 22–29)
CREAT SERPL-MCNC: 1.18 MG/DL (ref 0.76–1.27)
DEPRECATED RDW RBC AUTO: 41.8 FL (ref 37–54)
EGFRCR SERPLBLD CKD-EPI 2021: 85.7 ML/MIN/1.73
ERYTHROCYTE [DISTWIDTH] IN BLOOD BY AUTOMATED COUNT: 13.2 % (ref 12.3–15.4)
GLOBULIN UR ELPH-MCNC: 2.3 GM/DL
GLUCOSE SERPL-MCNC: 89 MG/DL (ref 65–99)
HCT VFR BLD AUTO: 41.9 % (ref 37.5–51)
HCV AB SER DONR QL: NORMAL
HDLC SERPL-MCNC: 27 MG/DL (ref 40–60)
HGB BLD-MCNC: 14.4 G/DL (ref 13–17.7)
LDLC SERPL CALC-MCNC: 100 MG/DL (ref 0–100)
LDLC/HDLC SERPL: 3.68 {RATIO}
MCH RBC QN AUTO: 30.2 PG (ref 26.6–33)
MCHC RBC AUTO-ENTMCNC: 34.4 G/DL (ref 31.5–35.7)
MCV RBC AUTO: 87.8 FL (ref 79–97)
PLATELET # BLD AUTO: 185 10*3/MM3 (ref 140–450)
PMV BLD AUTO: 11.7 FL (ref 6–12)
POTASSIUM SERPL-SCNC: 4.4 MMOL/L (ref 3.5–5.2)
PROT SERPL-MCNC: 7.2 G/DL (ref 6–8.5)
RBC # BLD AUTO: 4.77 10*6/MM3 (ref 4.14–5.8)
SODIUM SERPL-SCNC: 144 MMOL/L (ref 136–145)
TRIGL SERPL-MCNC: 93 MG/DL (ref 0–150)
TSH SERPL DL<=0.05 MIU/L-ACNC: 2.42 UIU/ML (ref 0.27–4.2)
URATE SERPL-MCNC: 5.7 MG/DL (ref 3.4–7)
VLDLC SERPL-MCNC: 18 MG/DL (ref 5–40)
WBC NRBC COR # BLD AUTO: 5.71 10*3/MM3 (ref 3.4–10.8)

## 2024-01-22 PROCEDURE — 86225 DNA ANTIBODY NATIVE: CPT | Performed by: NURSE PRACTITIONER

## 2024-01-22 PROCEDURE — 80061 LIPID PANEL: CPT | Performed by: NURSE PRACTITIONER

## 2024-01-22 PROCEDURE — 86803 HEPATITIS C AB TEST: CPT | Performed by: NURSE PRACTITIONER

## 2024-01-22 PROCEDURE — 80050 GENERAL HEALTH PANEL: CPT | Performed by: NURSE PRACTITIONER

## 2024-01-22 PROCEDURE — 86038 ANTINUCLEAR ANTIBODIES: CPT | Performed by: NURSE PRACTITIONER

## 2024-01-22 PROCEDURE — 84550 ASSAY OF BLOOD/URIC ACID: CPT | Performed by: NURSE PRACTITIONER

## 2024-01-22 PROCEDURE — 99213 OFFICE O/P EST LOW 20 MIN: CPT | Performed by: NURSE PRACTITIONER

## 2024-01-22 PROCEDURE — 86431 RHEUMATOID FACTOR QUANT: CPT | Performed by: NURSE PRACTITIONER

## 2024-01-22 NOTE — PROGRESS NOTES
Chief Complaint  Foot Pain (Left foot, big toe, having a flare up of gout, would like uric acid test.) and Letter for School/Work (Needs [facility] paperwork filled out/)    Subjective          Conrado An is a 28 y.o. male who presents to [facility]    History of Present Illness    Pt report hx of gout flare ups. Left great toe pain intermittently denies any flaring.   Pt reports last flare was November and foot was swollen for two weeks, foot is better toe pain remains.     Pt c/o jose alfredo hand pain as well.     PHQ-2 Total Score: 1   PHQ-9 Total Score: 1        Review of Systems   Constitutional:  Negative for chills, fatigue and fever.   Respiratory:  Negative for cough and shortness of breath.    Cardiovascular:  Negative for chest pain and palpitations.   Gastrointestinal:  Negative for constipation, diarrhea, nausea and vomiting.   Musculoskeletal:  Positive for arthralgias (left great toe pain). Negative for back pain and neck pain.   Skin:  Negative for rash.   Neurological:  Negative for dizziness and headaches.          Medical History: has a past medical history of GERD (gastroesophageal reflux disease), Injury of back, Lumbago with sciatica (01/19/2015), and Pars defect of lumbar spine.     Surgical History: has a past surgical history that includes Colonoscopy and Esophagogastroduodenoscopy (N/A, 8/18/2021).     Family History: family history includes Cancer in an other family member; Diabetes in an other family member; Heart attack in an other family member; Stroke in an other family member.     Social History: reports that he has never smoked. He has never used smokeless tobacco. He reports that he does not drink alcohol and does not use drugs.    Allergies: Patient has no known allergies.      Health Maintenance Due   Topic Date Due    COVID-19 Vaccine (1) Never done    TDAP/TD VACCINES (1 - Tdap) Never done    HEPATITIS C SCREENING  Never done    ANNUAL PHYSICAL   "Never done    BMI FOLLOWUP  02/26/2019    INFLUENZA VACCINE  Never done          No current outpatient medications on file.        There is no immunization history on file for this patient.      Objective       Vitals:    01/22/24 1032   BP: 133/84   Pulse: 77   Temp: 96.9 °F (36.1 °C)   TempSrc: Temporal   SpO2: 97%   Weight: 119 kg (261 lb 12.8 oz)   Height: 182.9 cm (72.01\")      Body mass index is 35.5 kg/m².   Wt Readings from Last 3 Encounters:   01/22/24 119 kg (261 lb 12.8 oz)   07/22/23 106 kg (234 lb)   01/22/22 124 kg (273 lb)      BP Readings from Last 3 Encounters:   01/22/24 133/84   07/22/23 130/80   01/22/22 154/92        Class 2 Severe Obesity (BMI >=35 and <=39.9). Obesity-related health conditions include the following: none. Obesity is unchanged. BMI is is above average; BMI management plan is completed. We discussed portion control and increasing exercise.       Physical Exam  Vitals reviewed.   Constitutional:       Appearance: Normal appearance. He is well-developed.   HENT:      Head: Normocephalic and atraumatic.   Eyes:      Conjunctiva/sclera: Conjunctivae normal.      Pupils: Pupils are equal, round, and reactive to light.   Neck:      Thyroid: No thyroid mass, thyromegaly or thyroid tenderness.      Vascular: No carotid bruit.   Cardiovascular:      Rate and Rhythm: Normal rate and regular rhythm.      Heart sounds: Normal heart sounds. No murmur heard.  Pulmonary:      Effort: Pulmonary effort is normal.      Breath sounds: Normal breath sounds. No wheezing or rhonchi.   Abdominal:      General: Bowel sounds are normal. There is no distension.      Palpations: Abdomen is soft.      Tenderness: There is no abdominal tenderness.   Skin:     General: Skin is warm and dry.   Neurological:      Mental Status: He is alert and oriented to person, place, and time.   Psychiatric:         Mood and Affect: Mood and affect normal.         Behavior: Behavior normal.         Thought Content: Thought " content normal.         Judgment: Judgment normal.             Result Review :                        Assessment and Plan        Diagnoses and all orders for this visit:    1. Great toe pain, left (Primary)  -     Uric Acid  -     Comprehensive Metabolic Panel    2. Lipid screening  -     Lipid Panel; Future  -     CBC (No Diff); Future    3. Screening for thyroid disorder  -     TSH; Future    4. Arthralgia, unspecified joint  -     Rheumatoid Factor; Future  -     KWESI; Future    5. Need for hepatitis C screening test  -     Hepatitis C Antibody; Future      Low purine diet recommended.     Follow Up     Return for Pending results.    Patient was given instructions and counseling regarding his condition or for health maintenance advice. Please see specific information pulled into the AVS if appropriate.     MORENA Mary

## 2024-01-23 LAB
ANA SER QL: POSITIVE
DSDNA AB SER-ACNC: <1 IU/ML (ref 0–9)
Lab: NORMAL

## 2024-01-24 DIAGNOSIS — R76.8 POSITIVE ANA (ANTINUCLEAR ANTIBODY): Primary | ICD-10-CM

## 2024-12-31 ENCOUNTER — APPOINTMENT (OUTPATIENT)
Dept: CT IMAGING | Facility: HOSPITAL | Age: 29
End: 2024-12-31
Payer: COMMERCIAL

## 2024-12-31 ENCOUNTER — HOSPITAL ENCOUNTER (EMERGENCY)
Facility: HOSPITAL | Age: 29
Discharge: HOME | End: 2024-12-31
Attending: EMERGENCY MEDICINE | Admitting: EMERGENCY MEDICINE
Payer: COMMERCIAL

## 2024-12-31 ENCOUNTER — APPOINTMENT (OUTPATIENT)
Dept: GENERAL RADIOLOGY | Facility: HOSPITAL | Age: 29
End: 2024-12-31
Payer: COMMERCIAL

## 2024-12-31 VITALS
RESPIRATION RATE: 16 BRPM | OXYGEN SATURATION: 99 % | HEIGHT: 72 IN | WEIGHT: 282.85 LBS | BODY MASS INDEX: 38.31 KG/M2 | DIASTOLIC BLOOD PRESSURE: 78 MMHG | HEART RATE: 66 BPM | SYSTOLIC BLOOD PRESSURE: 147 MMHG | TEMPERATURE: 100.1 F

## 2024-12-31 DIAGNOSIS — S13.9XXA NECK SPRAIN, INITIAL ENCOUNTER: ICD-10-CM
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
## 2024-12-31 DIAGNOSIS — M25.511 ACUTE PAIN OF RIGHT SHOULDER: ICD-10-CM

## 2024-12-31 DIAGNOSIS — M54.50 ACUTE MIDLINE LOW BACK PAIN WITHOUT SCIATICA: ICD-10-CM

## 2024-12-31 PROCEDURE — 71045 X-RAY EXAM CHEST 1 VIEW: CPT

## 2024-12-31 PROCEDURE — 99284 EMERGENCY DEPT VISIT MOD MDM: CPT

## 2024-12-31 PROCEDURE — 72125 CT NECK SPINE W/O DYE: CPT

## 2024-12-31 PROCEDURE — 70450 CT HEAD/BRAIN W/O DYE: CPT

## 2024-12-31 PROCEDURE — 72100 X-RAY EXAM L-S SPINE 2/3 VWS: CPT

## 2024-12-31 RX ORDER — IBUPROFEN 400 MG/1
800 TABLET, FILM COATED ORAL ONCE
Status: COMPLETED | OUTPATIENT
Start: 2024-12-31 | End: 2024-12-31

## 2024-12-31 RX ADMIN — IBUPROFEN 800 MG: 400 TABLET, FILM COATED ORAL at 20:56
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
## 2025-01-01 NOTE — DISCHARGE INSTRUCTIONS
CT of your head and neck negative. x-ray of your low back and shoulder are negative  Please take Tylenol Motrin for pain

## 2025-01-08 ENCOUNTER — TELEPHONE (OUTPATIENT)
Dept: FAMILY MEDICINE CLINIC | Facility: CLINIC | Age: 30
End: 2025-01-08

## 2025-01-08 NOTE — TELEPHONE ENCOUNTER
Caller: LYNETTE PULIDO    Relationship: Emergency Contact    Best call back number: [phone]     What is the medical concern/diagnosis: N/A     What specialty or service is being requested: NEUROLOGY       Any additional details: PATIENT IS CALLING REQUESTING FOR A REFERRAL TO NEUROLOGY SPECIFIC IN THE [facility].

## 2025-01-08 NOTE — TELEPHONE ENCOUNTER
Spoke to patient's wife and relayed   Pt will need an appt which can be telehealth to discuss his medical issues to determine proper diagnosis and referral.    MORENA Mary

## 2025-01-13 ENCOUNTER — OFFICE VISIT (OUTPATIENT)
Dept: FAMILY MEDICINE CLINIC | Facility: CLINIC | Age: 30
End: 2025-01-13
Payer: COMMERCIAL

## 2025-01-13 VITALS
SYSTOLIC BLOOD PRESSURE: 120 MMHG | BODY MASS INDEX: 37.9 KG/M2 | HEART RATE: 93 BPM | TEMPERATURE: 96.4 F | WEIGHT: 279.8 LBS | DIASTOLIC BLOOD PRESSURE: 80 MMHG | OXYGEN SATURATION: 97 % | HEIGHT: 72 IN

## 2025-01-13 DIAGNOSIS — S06.0XAD CONCUSSION WITH UNKNOWN LOSS OF CONSCIOUSNESS STATUS, SUBSEQUENT ENCOUNTER: ICD-10-CM

## 2025-01-13 DIAGNOSIS — M54.50 ACUTE BILATERAL LOW BACK PAIN WITHOUT SCIATICA: ICD-10-CM
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
## 2025-01-13 PROCEDURE — 99213 OFFICE O/P EST LOW 20 MIN: CPT | Performed by: NURSE PRACTITIONER

## 2025-01-13 RX ORDER — CYCLOBENZAPRINE HCL 10 MG
10 TABLET ORAL 3 TIMES DAILY PRN
Qty: 30 TABLET | Refills: 2 | Status: SHIPPED | OUTPATIENT
Start: 2025-01-13

## 2025-01-13 RX ORDER — METHYLPREDNISOLONE 4 MG/1
TABLET ORAL
Qty: 21 TABLET | Refills: 0 | Status: SHIPPED | OUTPATIENT
Start: 2025-01-13

## 2025-01-13 RX ORDER — IBUPROFEN 800 MG/1
800 TABLET, FILM COATED ORAL EVERY 6 HOURS PRN
Qty: 90 TABLET | Refills: 1 | Status: SHIPPED | OUTPATIENT
Start: 2025-01-13
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
## 2025-01-29 ENCOUNTER — OFFICE VISIT (OUTPATIENT)
Dept: FAMILY MEDICINE CLINIC | Facility: CLINIC | Age: 30
End: 2025-01-29
Payer: COMMERCIAL

## 2025-01-29 VITALS
SYSTOLIC BLOOD PRESSURE: 133 MMHG | HEIGHT: 72 IN | OXYGEN SATURATION: 98 % | HEART RATE: 88 BPM | BODY MASS INDEX: 37.79 KG/M2 | DIASTOLIC BLOOD PRESSURE: 94 MMHG | WEIGHT: 279 LBS | TEMPERATURE: 98.2 F

## 2025-01-29 DIAGNOSIS — M54.9 BACK PAIN, UNSPECIFIED BACK LOCATION, UNSPECIFIED BACK PAIN LATERALITY, UNSPECIFIED CHRONICITY: Primary | ICD-10-CM

## 2025-01-29 PROCEDURE — 99214 OFFICE O/P EST MOD 30 MIN: CPT | Performed by: STUDENT IN AN ORGANIZED HEALTH CARE EDUCATION/TRAINING PROGRAM

## 2025-01-29 RX ORDER — LIDOCAINE 50 MG/G
1 PATCH TOPICAL EVERY 24 HOURS
Qty: 30 PATCH | Refills: 0 | Status: SHIPPED | OUTPATIENT
Start: 2025-01-29 | End: 2025-02-28
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
## 2025-04-24 NOTE — PROGRESS NOTES
ACUTE VISIT     Patient Name: Conrado An  : 1995   MRN: [id number]     Chief Complaint:    Chief Complaint   Patient presents with    Gout    Foot Swelling       History of Present Illness: Conrado An is a 29 y.o. male who is here today for gout and left great toe pain     Patient states he has had this for over two weeks he is concerned that there is a foreign body substance crystals in his great toe     Pt reports seen at urgent care on [facility] in [place] 2 weeks prior and last week obtaining uric acid level that was elevated  Patient states they were supposed to fax this information while he was in the room he requested it to be faxed refused to have uric acid level obtained in office continue to call the urgent care for the fax to be sent to our office for review patient reports the urgent care in [place] Completed blood work and told him it was gout     Subjective      Review of Systems:   Review of Systems   Constitutional:  Negative for fever.   Respiratory:  Negative for cough.    Cardiovascular:  Negative for chest pain.   Musculoskeletal:  Positive for arthralgias.        Past Medical History:   Past Medical History:   Diagnosis Date    GERD (gastroesophageal reflux disease)     Injury of back     prior resolved lumbar injury    Lumbago with sciatica 2015    Pars defect of lumbar spine        Past Surgical History:   Past Surgical History:   Procedure Laterality Date    COLONOSCOPY      ENDOSCOPY N/A 2021    Procedure: ESOPHAGOGASTRODUODENOSCOPY with biopsies;  Surgeon: Nessa Carrero MD;  Location: [facility] ENDOSCOPY;  Service: Gastroenterology;  Laterality: N/A;  esophagitis       Family History:   Family History   Problem Relation Age of Onset    Stroke Other     Cancer Other         unspecified    Diabetes Other         unpecified typr    Heart attack Other         MI    Malig Hyperthermia Neg Hx        Social History:   Social History  "    Socioeconomic History    Marital status:    Tobacco Use    Smoking status: Never    Smokeless tobacco: Never   Vaping Use    Vaping status: Never Used   Substance and Sexual Activity    Alcohol use: No    Drug use: Never    Sexual activity: Defer       Medications:     Current Outpatient Medications:     indomethacin (INDOCIN) 50 MG capsule, Take 1 capsule by mouth 3 (Three) Times a Day With Meals., Disp: 21 capsule, Rfl: 0    methylPREDNISolone (MEDROL) 4 MG dose pack, Take as directed on package instructions., Disp: 21 tablet, Rfl: 0    allopurinol (Zyloprim) 100 MG tablet, Take 1 tablet by mouth Daily., Disp: 30 tablet, Rfl: 1    cyclobenzaprine (FLEXERIL) 10 MG tablet, Take 1 tablet by mouth 3 (Three) Times a Day As Needed for Muscle Spasms. (Patient not taking: Reported on 4/25/2025), Disp: 30 tablet, Rfl: 2    Allergies:   No Known Allergies      Objective     Physical Exam:  Vital Signs:   Vitals:    04/25/25 1516   BP: 126/74   Pulse: 80   Temp: 96.9 °F (36.1 °C)   SpO2: 97%   Weight: 122 kg (269 lb 9.6 oz)   Height: 182.9 cm (72.01\")   PainSc: 8    PainLoc: Foot     Body mass index is 36.55 kg/m².     Physical Exam  Cardiovascular:      Rate and Rhythm: Normal rate and regular rhythm.      Heart sounds: Normal heart sounds. No murmur heard.  Pulmonary:      Effort: Pulmonary effort is normal.      Breath sounds: Normal breath sounds.   Musculoskeletal:      Right lower leg: No edema.      Left lower leg: No edema.   Skin:     General: Skin is warm and dry.   Neurological:      Mental Status: He is alert.             Assessment / Plan      Assessment/Plan:   Diagnoses and all orders for this visit:    1. Great toe pain, left (Primary)  -     Cancel: Uric Acid  -     XR Foot 3+ View Left; Future  -     methylPREDNISolone (MEDROL) 4 MG dose pack; Take as directed on package instructions.  Dispense: 21 tablet; Refill: 0  -     Uric Acid; Future    Other orders  -     allopurinol (Zyloprim) 100 MG " tablet; Take 1 tablet by mouth Daily.  Dispense: 30 tablet; Refill: 1  -     indomethacin (INDOCIN) 50 MG capsule; Take 1 capsule by mouth 3 (Three) Times a Day With Meals.  Dispense: 21 capsule; Refill: 0    Great toe pain uric acid level elevated upon review of labs obtained at urgent care discussed that starting preventative therapy allopurinol can increase pain during acute gout attack patient is insistent on starting a pregnant medication patient states this will be fixed today patient is expressing very aggressive heavier in office Medrol Dosepak started will also provide indomethacin obtain x-ray and follow-up with PCP in 1 month        Follow Up:   Return if symptoms worsen or fail to improve.    MORENA Gilliland      Please note that portions of this note were completed with a voice recognition program.

## 2025-04-25 ENCOUNTER — OFFICE VISIT (OUTPATIENT)
Dept: FAMILY MEDICINE CLINIC | Facility: CLINIC | Age: 30
End: 2025-04-25
Payer: COMMERCIAL

## 2025-04-25 VITALS
HEIGHT: 72 IN | TEMPERATURE: 96.9 F | HEART RATE: 80 BPM | OXYGEN SATURATION: 97 % | SYSTOLIC BLOOD PRESSURE: 126 MMHG | BODY MASS INDEX: 36.52 KG/M2 | DIASTOLIC BLOOD PRESSURE: 74 MMHG | WEIGHT: 269.6 LBS

## 2025-04-25 DIAGNOSIS — M79.675 GREAT TOE PAIN, LEFT: Primary | ICD-10-CM

## 2025-04-25 RX ORDER — INDOMETHACIN 50 MG/1
50 CAPSULE ORAL
Qty: 21 CAPSULE | Refills: 0 | Status: SHIPPED | OUTPATIENT
Start: 2025-04-25

## 2025-04-25 RX ORDER — INDOMETHACIN 50 MG/1
CAPSULE ORAL
COMMUNITY
Start: 2025-04-17 | End: 2025-04-25 | Stop reason: SDUPTHER

## 2025-04-25 RX ORDER — METHYLPREDNISOLONE 4 MG/1
TABLET ORAL
Qty: 21 TABLET | Refills: 0 | Status: SHIPPED | OUTPATIENT
Start: 2025-04-25

## 2025-04-25 RX ORDER — ALLOPURINOL 100 MG/1
100 TABLET ORAL DAILY
Qty: 30 TABLET | Refills: 1 | Status: SHIPPED | OUTPATIENT
Start: 2025-04-25

## 2025-06-18 RX ORDER — ALLOPURINOL 100 MG/1
100 TABLET ORAL DAILY
Qty: 30 TABLET | Refills: 0 | Status: SHIPPED | OUTPATIENT
Start: 2025-06-18

## 2025-07-15 RX ORDER — ALLOPURINOL 100 MG/1
100 TABLET ORAL DAILY
Qty: 30 TABLET | Refills: 0 | Status: SHIPPED | OUTPATIENT
Start: 2025-07-15

## (undated) DEVICE — EGD OR ERCP KIT: Brand: MEDLINE INDUSTRIES, INC.

## (undated) DEVICE — Device: Brand: DEFENDO AIR/WATER/SUCTION AND BIOPSY VALVE

## (undated) DEVICE — SOL IRRG H2O PL/BG 1000ML STRL

## (undated) DEVICE — SINGLE-USE BIOPSY FORCEPS: Brand: RADIAL JAW 4